# Patient Record
Sex: MALE | Race: ASIAN | NOT HISPANIC OR LATINO | Employment: FULL TIME | URBAN - METROPOLITAN AREA
[De-identification: names, ages, dates, MRNs, and addresses within clinical notes are randomized per-mention and may not be internally consistent; named-entity substitution may affect disease eponyms.]

---

## 2017-07-28 ENCOUNTER — ALLSCRIPTS OFFICE VISIT (OUTPATIENT)
Dept: OTHER | Facility: OTHER | Age: 42
End: 2017-07-28

## 2017-08-05 ENCOUNTER — GENERIC CONVERSION - ENCOUNTER (OUTPATIENT)
Dept: OTHER | Facility: OTHER | Age: 42
End: 2017-08-05

## 2017-08-06 LAB
A/G RATIO (HISTORICAL): 1.9 (ref 1.2–2.2)
ALBUMIN SERPL BCP-MCNC: 4.7 G/DL (ref 3.5–5.5)
ALP SERPL-CCNC: 54 IU/L (ref 39–117)
ALT SERPL W P-5'-P-CCNC: 20 IU/L (ref 0–44)
AST SERPL W P-5'-P-CCNC: 24 IU/L (ref 0–40)
BILIRUB SERPL-MCNC: 0.5 MG/DL (ref 0–1.2)
BUN SERPL-MCNC: 13 MG/DL (ref 6–24)
BUN/CREA RATIO (HISTORICAL): 14 (ref 9–20)
CALCIUM SERPL-MCNC: 9.6 MG/DL (ref 8.7–10.2)
CHLORIDE SERPL-SCNC: 102 MMOL/L (ref 96–106)
CHOLEST SERPL-MCNC: 188 MG/DL (ref 100–199)
CO2 SERPL-SCNC: 23 MMOL/L (ref 18–29)
CREAT SERPL-MCNC: 0.96 MG/DL (ref 0.76–1.27)
EGFR AFRICAN AMERICAN (HISTORICAL): 111 ML/MIN/1.73
EGFR-AMERICAN CALC (HISTORICAL): 96 ML/MIN/1.73
GLUCOSE SERPL-MCNC: 95 MG/DL (ref 65–99)
HDLC SERPL-MCNC: 45 MG/DL
INTERPRETATION (HISTORICAL): NORMAL
LDLC SERPL CALC-MCNC: 119 MG/DL (ref 0–99)
POTASSIUM SERPL-SCNC: 4.4 MMOL/L (ref 3.5–5.2)
PROSTATE SPECIFIC ANTIGEN (HISTORICAL): 0.5 NG/ML (ref 0–4)
SODIUM SERPL-SCNC: 141 MMOL/L (ref 134–144)
TOT. GLOBULIN, SERUM (HISTORICAL): 2.5 G/DL (ref 1.5–4.5)
TOTAL PROTEIN (HISTORICAL): 7.2 G/DL (ref 6–8.5)
TRIGL SERPL-MCNC: 122 MG/DL (ref 0–149)
URIC ACID (HISTORICAL): 9.6 MG/DL (ref 3.7–8.6)

## 2017-08-07 ENCOUNTER — GENERIC CONVERSION - ENCOUNTER (OUTPATIENT)
Dept: OTHER | Facility: OTHER | Age: 42
End: 2017-08-07

## 2018-01-11 NOTE — RESULT NOTES
Verified Results  (1) COMPREHENSIVE METABOLIC PANEL [id number] 11:33ZU e|tab Bone     Test Name Result Flag Reference   Glucose, Serum 95 mg/dL  65-99   BUN 13 mg/dL  6-24   Creatinine, Serum 0 96 mg/dL  0 76-1 27   BUN/Creatinine Ratio 14  9-20   Sodium, Serum 141 mmol/L  134-144   Potassium, Serum 4 4 mmol/L  3 5-5 2   Chloride, Serum 102 mmol/L     Carbon Dioxide, Total 23 mmol/L  18-29   Calcium, Serum 9 6 mg/dL  8 7-10 2   Protein, Total, Serum 7 2 g/dL  6 0-8 5   Albumin, Serum 4 7 g/dL  3 5-5 5   Globulin, Total 2 5 g/dL  1 5-4 5   A/G Ratio 1 9  1 2-2 2   Bilirubin, Total 0 5 mg/dL  0 0-1 2   Alkaline Phosphatase, S 54 IU/L     AST (SGOT) 24 IU/L  0-40   ALT (SGPT) 20 IU/L  0-44   eGFR If NonAfricn Am 96 mL/min/1 73  >59   eGFR If Africn Am 111 mL/min/1 73  >59   Glucose, Serum 95 mg/dL  65-99   BUN 13 mg/dL  6-24   Creatinine, Serum 0 96 mg/dL  0 76-1 27   BUN/Creatinine Ratio 14  9-20   Sodium, Serum 141 mmol/L  134-144   Potassium, Serum 4 4 mmol/L  3 5-5 2   Chloride, Serum 102 mmol/L     Carbon Dioxide, Total 23 mmol/L  18-29   Calcium, Serum 9 6 mg/dL  8 7-10 2   Protein, Total, Serum 7 2 g/dL  6 0-8 5   Albumin, Serum 4 7 g/dL  3 5-5 5   Globulin, Total 2 5 g/dL  1 5-4 5   A/G Ratio 1 9  1 2-2 2   Bilirubin, Total 0 5 mg/dL  0 0-1 2   Alkaline Phosphatase, S 54 IU/L     AST (SGOT) 24 IU/L  0-40   ALT (SGPT) 20 IU/L  0-44   eGFR If NonAfricn Am 96 mL/min/1 73  >59   eGFR If Africn Am 111 mL/min/1 73  >59           (1) LIPID PANEL FASTING W DIRECT LDL REFLEX [id number] 07:11AM e|tab Bone     Test Name Result Flag Reference   Cholesterol, Total 188 mg/dL  100-199   Triglycerides 122 mg/dL  0-149   HDL Cholesterol 45 mg/dL  >39   LDL Cholesterol Calc 119 mg/dL H 0-99   Cholesterol, Total 188 mg/dL  100-199   Triglycerides 122 mg/dL  0-149   HDL Cholesterol 45 mg/dL  >39   LDL Cholesterol Calc 119 mg/dL H 0-99           (1) URIC ACID [id number] 07:11AM Lacy Hsu Test Name Result Flag Reference   Uric Acid, Serum 9 6 mg/dL H 3 7-8 6   Therapeutic target for gout patients: <6 0                       (1) PSA (SCREEN) (Dx V76 44 Screen for Prostate Cancer) [id number] 07:11AM Madhu Roberto     Test Name Result Flag Reference   Prostate Specific Ag, Serum 0 5 ng/mL  0 0-4 0   Roche ECLIA methodology  According to the American Urological Association, Serum PSA should  decrease and remain at undetectable levels after radical  prostatectomy  The AUA defines biochemical recurrence as an initial  PSA value 0 2 ng/mL or greater followed by a subsequent confirmatory  PSA value 0 2 ng/mL or greater  Values obtained with different assay methods or kits cannot be used  interchangeably  Results cannot be interpreted as absolute evidence  of the presence or absence of malignant disease  [facility]) Cardiovascular Risk Assessment [id number] 07:11AM Madhu Fanmarilu     Test Name Result Flag Reference   Interpretation Note     Supplement report is available  PDF Image

## 2018-01-12 NOTE — MISCELLANEOUS
Message   Recorded as Task   Date: 07/27/2016 09:32 AM, Created By: Holly Raza   Task Name: Call Back   Assigned To: Richar Mohamud   Regarding Patient: Kelby Falk, Status: Active   CommentDerwin Jonancy - 27 Jul 2016 9:32 AM     TASK CREATED  Caller: Self; [phone] (Home)  DR Amado Rodriguez   Pt needs a letter for his employer that he was seen here 7/19/2016  Tahmina Mojave - 27 Jul 2016 9:42 AM     TASK EDITED                 letter written and signed by Dr NEVILLE, a copy was made   The patient is aware the letter is up front for   hg   Richar Mohamud - 27 Jul 2016 5:55 PM     TASK EDITED                 noted        Signatures   Electronically signed by : Lily Mayes DO; Jul 27 2016  5:55PM EST                       (Author)

## 2018-01-13 NOTE — PROGRESS NOTES
Assessment    1  Prostate cancer screening (V76 44) (Z12 5)   2  Screening for cardiovascular condition (V81 2) (Z13 6)   3  Screening for diabetes mellitus (DM) (V77 1) (Z13 1)   4  Screening for lipid disorders (V77 91) (Z13 220)   5  Thyroid disorder screening (V77 0) (Z13 29)   6  Encounter for preventive health examination (V70 0) (Z00 00)   7  Chronic gout (274 02) (M1A 9XX0)   8  BMI 27 0-27 9,adult (V85 23) (Z68 27)   9  Overweight (278 02) (E66 3)    Plan  Chronic gout    · Colchicine 0 6 MG Oral Tablet; TAKE 2 TABLETS BY MOUTH AT THE ONSET OF   GOUT FLARE AND 1 TABLET IN 1 HOUR IF NEEDED    1 8MG MAX IN 24 HOURS   · (1) URIC ACID; Status:Active; Requested for:10Ils2282;   Fatigue, Prostate cancer screening    · (1) PSA (SCREEN) (Dx V76 44 Screen for Prostate Cancer); Status:Active; Requested  for:09Epy3528; Health Maintenance, Fatigue, Prostate cancer screening, Screening for cardiovascular  condition, Screening for diabetes mellitus (DM), Screening for lipid disorders,  Thyroid disorder screening    · (1) TSH; Status:Active; Requested for:90Zpf8819; Health Maintenance, Prostate cancer screening, Screening for cardiovascular condition,  Screening for diabetes mellitus (DM), Screening for lipid disorders, Thyroid  disorder screening    · (1) COMPREHENSIVE METABOLIC PANEL; Status:Active; Requested for:[id number];    · (1) LIPID PANEL FASTING W DIRECT LDL REFLEX; Status:Active; Requested  for:06Oel0443;     Discussion/Summary  Impression: health maintenance visit  Currently, he eats a poor diet and has an inadequate exercise regimen  Patient discussion: discussed with the patient  The patient was counseled regarding instructions for management, risk factor reductions, impressions  Chief Complaint  cpe      History of Present Illness  HM, Adult Male: The patient is being seen for a health maintenance and 2yo was last cpe evaluation  General Health:  The patient's health since the last visit is described as good  Immunizations status:  unknown  Lifestyle:  He consumes a diverse and healthy diet  He has weight concerns  He does not exercise regularly  He does not use tobacco  He denies alcohol use  Screening:   HPI: bp 124/80  gout attacks in past, none currently, could do better with diet/fluids      Review of Systems    Cardiovascular: no chest pain  Respiratory: no shortness of breath  Gastrointestinal: no abdominal pain and no blood in stools  Genitourinary: no dysuria and no incontinence  Neurological: no dizziness and no fainting  Active Problems    1  Colon cancer screening (V76 51) (Z12 11)   2  Immunization due (V05 9) (Z23)   3  Prostate cancer screening (V76 44) (Z12 5)   4  Screening for cardiovascular condition (V81 2) (Z13 6)   5  Screening for diabetes mellitus (DM) (V77 1) (Z13 1)   6  Screening for lipid disorders (V77 91) (Z13 220)   7  Thyroid disorder screening (V77 0) (Z13 29)    Family History  Mother    · No pertinent family history  Father    · Family history of hypertension (V17 49) (Z82 49)   · Family history of malignant neoplasm of prostate (V16 42) (Z80 45)    Social History    · Never smoker    Current Meds   1  Multi-Vitamin Oral Tablet; two a day Recorded    Allergies    1  No Known Drug Allergies    Vitals   Recorded: [id number] 08:37PM Recorded: 51VBG4459 04:30PM   Temperature  98 2 F   Heart Rate  84   Respiration  16   Systolic 931 902   Diastolic 80 90   Height  5 ft 8 in   Weight  182 lb    BMI Calculated  27 67   BSA Calculated  1 97     Physical Exam    Constitutional   General appearance: No acute distress, well appearing and well nourished  Eyes   Conjunctiva and lids: No erythema, swelling or discharge  Pupils and irises: Equal, round, reactive to light  Ears, Nose, Mouth, and Throat   External inspection of ears and nose: Normal     Otoscopic examination: Tympanic membranes translucent with normal light reflex   Canals patent without erythema  Nasal mucosa, septum, and turbinates: Normal without edema or erythema  Lips, teeth, and gums: Normal, good dentition  Oropharynx: Normal with no erythema, edema, exudate or lesions  Neck   Neck: Supple, symmetric, trachea midline, no masses  Thyroid: Normal, no thyromegaly  Pulmonary   Respiratory effort: No increased work of breathing or signs of respiratory distress  Auscultation of lungs: Clear to auscultation  Cardiovascular   Auscultation of heart: Normal rate and rhythm, normal S1 and S2, no murmurs  Carotid pulses: 2+ bilaterally  Pedal pulses: 2+ bilaterally  Examination of extremities for edema and/or varicosities: Normal     Abdomen   Abdomen: Non-tender, no masses  Liver and spleen: No hepatomegaly or splenomegaly  Examination for hernias: No hernias appreciated  Anus, perineum, and rectum: Normal sphincter tone, no masses, no prolapse  Genitourinary   Scrotal contents: Normal testes, no masses  Penis: Normal, no lesions  Digital rectal exam of prostate: Normal size, no masses  Lymphatic   Palpation of lymph nodes in neck: No lymphadenopathy  Palpation of lymph nodes in groin: No lymphadenopathy  Musculoskeletal   Gait and station: Normal     Inspection/palpation of digits and nails: Normal without clubbing or cyanosis  Inspection/palpation of joints, bones, and muscles: Normal     Range of motion: Normal     Stability: Normal     Muscle strength/tone: Normal     Skin   Skin and subcutaneous tissue: Normal without rashes or lesions  Palpation of skin and subcutaneous tissue: Normal turgor  Neurologic   Reflexes: 2+ and symmetric  Sensation: No sensory loss  Psychiatric   Judgment and insight: Normal     Mood and affect: Normal        Procedure    Procedure: Indication: routine screening     Results: 20/20 in both eyes without corrective device, 20/20 in the right eye without corrective device, 20/20 in the left eye without corrective device   Color vision was and the results were normal       Signatures   Electronically signed by : Anayeli Amaya DO; May 18 2016  8:41PM EST                       (Author)

## 2018-01-15 NOTE — RESULT NOTES
Verified Results  (1) COMPREHENSIVE METABOLIC PANEL [id number] 54:78RK Nolvia Pelt     Test Name Result Flag Reference   Glucose, Serum 92 mg/dL  65-99   BUN 16 mg/dL  6-24   Creatinine, Serum 1 03 mg/dL  0 76-1 27   eGFR If NonAfricn Am 89 mL/min/1 73  >59   eGFR If Africn Am 103 mL/min/1 73  >59   BUN/Creatinine Ratio 16  9-20   Sodium, Serum 140 mmol/L  134-144   Potassium, Serum 4 3 mmol/L  3 5-5 2   Chloride, Serum 101 mmol/L     Carbon Dioxide, Total 21 mmol/L  18-29   Calcium, Serum 9 8 mg/dL  8 7-10 2   Protein, Total, Serum 7 4 g/dL  6 0-8 5   Albumin, Serum 4 7 g/dL  3 5-5 5   Globulin, Total 2 7 g/dL  1 5-4 5   A/G Ratio 1 7  1 1-2 5   Bilirubin, Total 0 6 mg/dL  0 0-1 2   Alkaline Phosphatase, S 58 IU/L     AST (SGOT) 19 IU/L  0-40   ALT (SGPT) 21 IU/L  0-44     (1) LIPID PANEL FASTING W DIRECT LDL REFLEX [id number] 08:06AM Nolvia Pelt     Test Name Result Flag Reference   Cholesterol, Total 194 mg/dL  100-199   Triglycerides 230 mg/dL H 0-149   HDL Cholesterol 42 mg/dL  >39   According to ATP-III Guidelines, HDL-C >59 mg/dL is considered a  negative risk factor for CHD  LDL Cholesterol Calc 106 mg/dL H 0-99     (1) TSH [id number] 08:06AM Nolvia Pelt     Test Name Result Flag Reference   TSH 1 970 uIU/mL  0 450-4 500     (1) URIC ACID [id number] 08:06AM Nolvia Pelt     Test Name Result Flag Reference   Uric Acid, Serum 11 9 mg/dL H 3 7-8 6   Therapeutic target for gout patients: <6 0     [facility]) Cardiovascular Risk Assessment [id number] 08:06AM Nolvia Pelt     Test Name Result Flag Reference   Interpretation Note     Supplement report is available  PDF Image   (1) PSA (SCREEN) (Dx V76 44 Screen for Prostate Cancer) [id number] 08:06AM Nolvia Pelt     Test Name Result Flag Reference   Prostate Specific Ag, Serum 0 4 ng/mL  0 0-4 0   Roche ECLIA methodology    According to the American Urological Association, Serum PSA should  decrease and remain at undetectable levels after radical  prostatectomy  The AUA defines biochemical recurrence as an initial  PSA value 0 2 ng/mL or greater followed by a subsequent confirmatory  PSA value 0 2 ng/mL or greater  Values obtained with different assay methods or kits cannot be used  interchangeably  Results cannot be interpreted as absolute evidence  of the presence or absence of malignant disease  Discussion/Summary   Sugar, kidneys, minerals and liver are normal    Cholesterol is ok  Gout level (uric acid) is high  Medications for prevention of attacks can be discussed  Thyroid level is good     Prostate level is normal   Dr Fulton

## 2018-01-16 NOTE — PROGRESS NOTES
Assessment    1  Chronic gout (274 02) (M1A 9XX0)   2  Screening for cardiovascular, respiratory, and genitourinary diseases   (V81 2,V81 4,V81 6) (Z13 6,Z13 83,Z13 89)   3  Screening for diabetes mellitus (DM) (V77 1) (Z13 1)   4  Encounter for preventive health examination (V70 0) (Z00 00)   5  BMI 26 0-26 9,adult (V85 22) (Z68 26)   6  History of Overweight (278 02) (E66 3)    Plan  Chronic gout    · Colchicine 0 6 MG Oral Tablet; TAKE 2 TABLETS BY MOUTH AT THE ONSET OF   GOUT FLARE AND 1 TABLET IN 1 HOUR IF NEEDED    1 8MG MAX IN 24 HOURS  Chronic gout, Health Maintenance, Prostate cancer screening, Screening for  cardiovascular, respiratory, and genitourinary diseases, Screening for diabetes mellitus  (DM)    · (1) PSA (SCREEN) (Dx V76 44 Screen for Prostate Cancer); Status:Active; Requested  for:39Eay5072;   Chronic gout, Health Maintenance, Screening for cardiovascular, respiratory, and  genitourinary diseases, Screening for diabetes mellitus (DM)    · (1) COMPREHENSIVE METABOLIC PANEL; Status:Active; Requested for:[id number];    · (1) LIPID PANEL FASTING W DIRECT LDL REFLEX; Status:Active; Requested  for:66Ecd5529;    · (1) URIC ACID; Status:Active; Requested for:52Zzm0710;     Discussion/Summary  The patient was counseled regarding risk factor reductions, impressions, risks and benefits of treatment options, importance of compliance with treatment  Possible side effects of new medications were reviewed with the patient/guardian today  The treatment plan was reviewed with the patient/guardian  The patient/guardian understands and agrees with the treatment plan      Chief Complaint  Seen for a CPE  er/cma  History of Present Illness  HM, Adult Male: The patient is being seen for a health maintenance evaluation  General Health: The patient's health since the last visit is described as good  Lifestyle:  He does not have a healthy diet  He does not exercise regularly   He does not use tobacco  He denies alcohol use  Screening:   HPI: rare gout attack  maybe 1 per year  diet aware  has 3yo adopted child from [address] [place] [address]    Cardiovascular: no chest pain  Respiratory: no shortness of breath  Gastrointestinal: no abdominal pain  Active Problems    1  Chronic gout (274 02) (M1A 9XX0)   2  Colon cancer screening (V76 51) (Z12 11)   3  Immunization due (V05 9) (Z23)   4  Impacted cerumen (380 4) (H61 20)   5  Prostate cancer screening (V76 44) (Z12 5)   6  Screening for cardiovascular, respiratory, and genitourinary diseases   (V81 2,V81 4,V81 6) (Z13 6,Z13 83,Z13 89)   7  Screening for diabetes mellitus (DM) (V77 1) (Z13 1)   8  Thyroid disorder screening (V77 0) (Z13 29)    Past Medical History    · History of Actinic otitis externa of right ear, unspecified chronicity (380 22) (H60 8X1)   · History of fatigue (V13 89) (X67 897)   · History of Overweight (278 02) (E66 3)    Family History  Mother    · No pertinent family history  Father    · Family history of hypertension (V17 49) (Z82 49)   · Family history of malignant neoplasm of prostate (V16 42) (Z80 45)    Social History    · Never smoker    Current Meds   1  Multi-Vitamin Oral Tablet; two a day; Therapy: (Recorded:90Jxg0263) to Recorded    Allergies    1  No Known Drug Allergies    Vitals   Recorded: 16Ytl9241 03:59PM   Temperature 96 2 F   Heart Rate 76   Respiration 16   Systolic 447   Diastolic 82   Height 5 ft 8 in   Weight 175 lb    BMI Calculated 26 61   BSA Calculated 1 93     Physical Exam    Constitutional   General appearance: No acute distress, well appearing and well nourished  Eyes   Conjunctiva and lids: No erythema, swelling or discharge  Pupils and irises: Equal, round, reactive to light  Ears, Nose, Mouth, and Throat   External inspection of ears and nose: Normal     Otoscopic examination: Tympanic membranes translucent with normal light reflex  Canals patent without erythema      Nasal mucosa, septum, and turbinates: Normal without edema or erythema  Lips, teeth, and gums: Normal, good dentition  Oropharynx: Normal with no erythema, edema, exudate or lesions  Neck   Neck: Supple, symmetric, trachea midline, no masses  Thyroid: Normal, no thyromegaly  Pulmonary   Respiratory effort: No increased work of breathing or signs of respiratory distress  Auscultation of lungs: Clear to auscultation  Cardiovascular   Auscultation of heart: Normal rate and rhythm, normal S1 and S2, no murmurs  Carotid pulses: 2+ bilaterally  Pedal pulses: 2+ bilaterally  Examination of extremities for edema and/or varicosities: Normal     Abdomen   Abdomen: Non-tender, no masses  Liver and spleen: No hepatomegaly or splenomegaly  Genitourinary   Scrotal contents: Normal testes, no masses  Penis: Normal, no lesions  Digital rectal exam of prostate: Normal size, no masses  Lymphatic   Palpation of lymph nodes in neck: No lymphadenopathy  Palpation of lymph nodes in groin: No lymphadenopathy  Musculoskeletal   Gait and station: Normal     Inspection/palpation of digits and nails: Normal without clubbing or cyanosis  Inspection/palpation of joints, bones, and muscles: Normal     Range of motion: Normal     Stability: Normal     Muscle strength/tone: Normal     Skin   Skin and subcutaneous tissue: Normal without rashes or lesions  Palpation of skin and subcutaneous tissue: Normal turgor  Neurologic   Reflexes: 2+ and symmetric  Sensation: No sensory loss  Psychiatric   Judgment and insight: Normal     Mood and affect: Normal        Results/Data  PHQ-2 Adult Depression Screening [id number] 04:04PM User, s     Test Name Result Flag Reference   PHQ-2 Adult Depression Score 0     Over the last two weeks, how often have you been bothered by any of the following problems?   Little interest or pleasure in doing things: Not at all - 0  Feeling down, depressed, or hopeless: Not at all - 0 PHQ-2 Adult Depression Screening Negative         Procedure    Procedure: Visual Acuity Test    Indication: routine screening  Inforrmation supplied by a Snellen chart  Results: 20/20 in both eyes without corrective device, 20/25 in the right eye without corrective device, 20/20 in the left eye without corrective device      Provider Comments  Provider Comments:   check uric acid level w/o attack for baseline  Diet/exercise/weight loss is recommended          Signatures   Electronically signed by : Marivel Zamora DO; Jul 28 2017 10:01PM EST                       (Author)

## 2018-01-22 VITALS
WEIGHT: 175 LBS | BODY MASS INDEX: 26.52 KG/M2 | HEIGHT: 68 IN | RESPIRATION RATE: 16 BRPM | DIASTOLIC BLOOD PRESSURE: 82 MMHG | HEART RATE: 76 BPM | SYSTOLIC BLOOD PRESSURE: 130 MMHG | TEMPERATURE: 96.2 F

## 2019-01-14 ENCOUNTER — TELEPHONE (OUTPATIENT)
Dept: FAMILY MEDICINE CLINIC | Facility: CLINIC | Age: 44
End: 2019-01-14

## 2019-01-14 NOTE — TELEPHONE ENCOUNTER
Ronnie Zacarias is saying he has gout and he needs a prescription  I told him 5 times that dr Dorothea Escamilla will not prescribe him anything because he has NOT been seen since 7/2017  He would not give up  He said he can barely walk so he can't come in      Please call patient

## 2019-01-14 NOTE — TELEPHONE ENCOUNTER
He has a diagnosis of gout and I have prescribed him medication  If he was not on any medication, he would be advised to be seen yearly  Since I prescribe him medication, he would need to be seen every 6 months for any refills

## 2019-01-14 NOTE — TELEPHONE ENCOUNTER
LMOM to call back office and that Pt needed an appointment to get script because he has not been here since July 2017    Pt contact  number [phone]  Number in chart is not working  [facility]

## 2019-05-12 RX ORDER — COLCHICINE 0.6 MG/1
TABLET ORAL
COMMUNITY
Start: 2016-05-18 | End: 2019-05-15 | Stop reason: SDUPTHER

## 2019-05-15 ENCOUNTER — OFFICE VISIT (OUTPATIENT)
Dept: FAMILY MEDICINE CLINIC | Facility: CLINIC | Age: 44
End: 2019-05-15
Payer: COMMERCIAL

## 2019-05-15 VITALS
SYSTOLIC BLOOD PRESSURE: 124 MMHG | HEIGHT: 68 IN | DIASTOLIC BLOOD PRESSURE: 80 MMHG | BODY MASS INDEX: 25.82 KG/M2 | TEMPERATURE: 98.6 F | WEIGHT: 170.4 LBS | RESPIRATION RATE: 18 BRPM

## 2019-05-15 DIAGNOSIS — Z13.89 SCREENING FOR CARDIOVASCULAR, RESPIRATORY, AND GENITOURINARY DISEASES: ICD-10-CM

## 2019-05-15 DIAGNOSIS — Z12.5 PROSTATE CANCER SCREENING: ICD-10-CM

## 2019-05-15 DIAGNOSIS — Z13.83 SCREENING FOR CARDIOVASCULAR, RESPIRATORY, AND GENITOURINARY DISEASES: ICD-10-CM

## 2019-05-15 DIAGNOSIS — Z23 IMMUNIZATION DUE: ICD-10-CM

## 2019-05-15 DIAGNOSIS — Z13.6 SCREENING FOR CARDIOVASCULAR, RESPIRATORY, AND GENITOURINARY DISEASES: ICD-10-CM

## 2019-05-15 DIAGNOSIS — Z13.1 SCREENING FOR DIABETES MELLITUS (DM): ICD-10-CM

## 2019-05-15 DIAGNOSIS — M1A.9XX0 CHRONIC GOUT INVOLVING TOE WITHOUT TOPHUS, UNSPECIFIED CAUSE, UNSPECIFIED LATERALITY: ICD-10-CM

## 2019-05-15 DIAGNOSIS — Z00.00 HEALTHCARE MAINTENANCE: Primary | ICD-10-CM

## 2019-05-15 PROCEDURE — 90715 TDAP VACCINE 7 YRS/> IM: CPT

## 2019-05-15 PROCEDURE — 99396 PREV VISIT EST AGE 40-64: CPT | Performed by: FAMILY MEDICINE

## 2019-05-15 PROCEDURE — 90471 IMMUNIZATION ADMIN: CPT

## 2019-05-15 RX ORDER — COLCHICINE 0.6 MG/1
0.6 TABLET ORAL 3 TIMES DAILY PRN
Qty: 60 TABLET | Refills: 1 | Status: SHIPPED | OUTPATIENT
Start: 2019-05-15 | End: 2020-09-30 | Stop reason: SDUPTHER

## 2019-05-19 LAB
ALBUMIN SERPL-MCNC: 4.9 G/DL (ref 3.5–5.5)
ALBUMIN/GLOB SERPL: 1.8 {RATIO} (ref 1.2–2.2)
ALP SERPL-CCNC: 56 IU/L (ref 39–117)
ALT SERPL-CCNC: 21 IU/L (ref 0–44)
AST SERPL-CCNC: 25 IU/L (ref 0–40)
BILIRUB SERPL-MCNC: 0.6 MG/DL (ref 0–1.2)
BUN SERPL-MCNC: 12 MG/DL (ref 6–24)
BUN/CREAT SERPL: 12 (ref 9–20)
CALCIUM SERPL-MCNC: 9.9 MG/DL (ref 8.7–10.2)
CHLORIDE SERPL-SCNC: 101 MMOL/L (ref 96–106)
CHOLEST SERPL-MCNC: 215 MG/DL (ref 100–199)
CO2 SERPL-SCNC: 22 MMOL/L (ref 20–29)
CREAT SERPL-MCNC: 1.03 MG/DL (ref 0.76–1.27)
GLOBULIN SER-MCNC: 2.7 G/DL (ref 1.5–4.5)
GLUCOSE SERPL-MCNC: 99 MG/DL (ref 65–99)
HDLC SERPL-MCNC: 55 MG/DL
LABCORP COMMENT: NORMAL
LDLC SERPL CALC-MCNC: 131 MG/DL (ref 0–99)
MICRODELETION SYND BLD/T FISH: NORMAL
POTASSIUM SERPL-SCNC: 4.3 MMOL/L (ref 3.5–5.2)
PROT SERPL-MCNC: 7.6 G/DL (ref 6–8.5)
PSA SERPL-MCNC: 0.5 NG/ML (ref 0–4)
SL AMB EGFR AFRICAN AMERICAN: 101 ML/MIN/1.73
SL AMB EGFR NON AFRICAN AMERICAN: 87 ML/MIN/1.73
SODIUM SERPL-SCNC: 139 MMOL/L (ref 134–144)
TRIGL SERPL-MCNC: 143 MG/DL (ref 0–149)
URATE SERPL-MCNC: 9.4 MG/DL (ref 3.7–8.6)

## 2020-09-30 ENCOUNTER — OFFICE VISIT (OUTPATIENT)
Dept: FAMILY MEDICINE CLINIC | Facility: CLINIC | Age: 45
End: 2020-09-30
Payer: COMMERCIAL

## 2020-09-30 VITALS
WEIGHT: 172 LBS | DIASTOLIC BLOOD PRESSURE: 84 MMHG | HEART RATE: 82 BPM | TEMPERATURE: 97.5 F | RESPIRATION RATE: 16 BRPM | HEIGHT: 69 IN | OXYGEN SATURATION: 97 % | BODY MASS INDEX: 25.48 KG/M2 | SYSTOLIC BLOOD PRESSURE: 136 MMHG

## 2020-09-30 DIAGNOSIS — Z13.83 SCREENING FOR CARDIOVASCULAR, RESPIRATORY, AND GENITOURINARY DISEASES: ICD-10-CM

## 2020-09-30 DIAGNOSIS — M1A.9XX0 CHRONIC GOUT INVOLVING TOE WITHOUT TOPHUS, UNSPECIFIED CAUSE, UNSPECIFIED LATERALITY: ICD-10-CM

## 2020-09-30 DIAGNOSIS — Z13.1 SCREENING FOR DIABETES MELLITUS (DM): ICD-10-CM

## 2020-09-30 DIAGNOSIS — Z13.6 SCREENING FOR CARDIOVASCULAR, RESPIRATORY, AND GENITOURINARY DISEASES: ICD-10-CM

## 2020-09-30 DIAGNOSIS — Z13.89 SCREENING FOR CARDIOVASCULAR, RESPIRATORY, AND GENITOURINARY DISEASES: ICD-10-CM

## 2020-09-30 DIAGNOSIS — Z00.00 HEALTHCARE MAINTENANCE: Primary | ICD-10-CM

## 2020-09-30 DIAGNOSIS — Z12.5 PROSTATE CANCER SCREENING: ICD-10-CM

## 2020-09-30 PROCEDURE — 99396 PREV VISIT EST AGE 40-64: CPT | Performed by: FAMILY MEDICINE

## 2020-09-30 RX ORDER — COLCHICINE 0.6 MG/1
0.6 TABLET ORAL 3 TIMES DAILY PRN
Qty: 30 TABLET | Refills: 2 | Status: SHIPPED | OUTPATIENT
Start: 2020-09-30

## 2020-09-30 NOTE — PROGRESS NOTES
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, unspecified cause, unspecified laterality  -     colchicine (COLCRYS) 0 6 mg tablet; Take 1 tablet (0 6 mg total) by mouth 3 (three) times a day as needed (gout attacks)  -     Uric acid; Future          Return if symptoms worsen or fail to improve  Subjective:      Patient ID: Gini Mcgarry is a 50 y o  male  Chief Complaint   Patient presents with    Physical Exam       HPI  No wt change  Gout attacks 1-2x/w  Physical work  No exercise   work    Wife a nurse in [place]    Colchicine works in past, tolerated    Has a son, 8yo    The following portions of the patient's history were reviewed and updated as appropriate: allergies, current medications, past family history, past medical history, past social history, past surgical history and problem list     Review of Systems   Constitutional: Negative for fever  Respiratory: Negative for shortness of breath  Cardiovascular: Negative for chest pain  Current Outpatient Medications   Medication Sig Dispense Refill    Multiple Vitamin (MULTI VITAMIN DAILY PO) Take by mouth      colchicine (COLCRYS) 0 6 mg tablet Take 1 tablet (0 6 mg total) by mouth 3 (three) times a day as needed (gout attacks) 30 tablet 2     No current facility-administered medications for this visit          Objective:    /84   Pulse 82   Temp 97 5 °F (36 4 °C)   Resp 16   Ht 5' 9" (1 753 m) Wt 78 kg (172 lb)   SpO2 97%   BMI 25 40 kg/m²        Physical Exam  Vitals signs and nursing note reviewed  Constitutional:       General: He is not in acute distress  Appearance: He is well-developed  He is not ill-appearing  HENT:      Head: Normocephalic  Right Ear: Tympanic membrane normal       Left Ear: Tympanic membrane normal    Eyes:      General: No scleral icterus  Conjunctiva/sclera: Conjunctivae normal    Neck:      Musculoskeletal: Neck supple  Vascular: No carotid bruit  Cardiovascular:      Rate and Rhythm: Normal rate and regular rhythm  Heart sounds: No murmur  Pulmonary:      Effort: Pulmonary effort is normal  No respiratory distress  Breath sounds: No wheezing  Abdominal:      General: There is no distension  Palpations: Abdomen is soft  Tenderness: There is no abdominal tenderness  Hernia: No hernia is present  Genitourinary:     Penis: Normal        Scrotum/Testes: Normal       Prostate: Normal       Rectum: Normal    Musculoskeletal:         General: No deformity  Skin:     General: Skin is warm and dry  Coloration: Skin is not pale  Neurological:      Mental Status: He is alert  Motor: No weakness  Gait: Gait normal    Psychiatric:         Mood and Affect: Mood normal          Behavior: Behavior normal          Thought Content: Thought content normal        BMI Counseling: Body mass index is 25 4 kg/m²  The BMI is above normal  Nutrition recommendations include decreasing portion sizes and moderation in carbohydrate intake  Exercise recommendations include exercising 3-5 times per week  No pharmacotherapy was ordered                  Arelis Brennan DO

## 2020-10-03 LAB
ALBUMIN SERPL-MCNC: 4.9 G/DL (ref 4–5)
ALBUMIN/GLOB SERPL: 2 {RATIO} (ref 1.2–2.2)
ALP SERPL-CCNC: 51 IU/L (ref 39–117)
ALT SERPL-CCNC: 27 IU/L (ref 0–44)
AST SERPL-CCNC: 32 IU/L (ref 0–40)
BILIRUB SERPL-MCNC: 0.4 MG/DL (ref 0–1.2)
BUN SERPL-MCNC: 13 MG/DL (ref 6–24)
BUN/CREAT SERPL: 12 (ref 9–20)
CALCIUM SERPL-MCNC: 9.8 MG/DL (ref 8.7–10.2)
CHLORIDE SERPL-SCNC: 102 MMOL/L (ref 96–106)
CHOLEST SERPL-MCNC: 208 MG/DL (ref 100–199)
CO2 SERPL-SCNC: 20 MMOL/L (ref 20–29)
CREAT SERPL-MCNC: 1.07 MG/DL (ref 0.76–1.27)
GLOBULIN SER-MCNC: 2.5 G/DL (ref 1.5–4.5)
GLUCOSE SERPL-MCNC: 81 MG/DL (ref 65–99)
HDLC SERPL-MCNC: 56 MG/DL
LDLC SERPL CALC-MCNC: 126 MG/DL (ref 0–99)
MICRODELETION SYND BLD/T FISH: NORMAL
POTASSIUM SERPL-SCNC: 3.7 MMOL/L (ref 3.5–5.2)
PROT SERPL-MCNC: 7.4 G/DL (ref 6–8.5)
PSA SERPL-MCNC: 0.4 NG/ML (ref 0–4)
SL AMB EGFR AFRICAN AMERICAN: 95 ML/MIN/1.73
SL AMB EGFR NON AFRICAN AMERICAN: 82 ML/MIN/1.73
SODIUM SERPL-SCNC: 139 MMOL/L (ref 134–144)
TRIGL SERPL-MCNC: 149 MG/DL (ref 0–149)
URATE SERPL-MCNC: 9.2 MG/DL (ref 3.7–8.6)

## 2021-10-12 ENCOUNTER — OFFICE VISIT (OUTPATIENT)
Dept: FAMILY MEDICINE CLINIC | Facility: CLINIC | Age: 46
End: 2021-10-12
Payer: COMMERCIAL

## 2021-10-12 VITALS
WEIGHT: 169 LBS | TEMPERATURE: 98 F | HEART RATE: 77 BPM | SYSTOLIC BLOOD PRESSURE: 122 MMHG | RESPIRATION RATE: 16 BRPM | HEIGHT: 68 IN | DIASTOLIC BLOOD PRESSURE: 76 MMHG | BODY MASS INDEX: 25.61 KG/M2 | OXYGEN SATURATION: 97 %

## 2021-10-12 DIAGNOSIS — Z13.6 SCREENING FOR CARDIOVASCULAR, RESPIRATORY, AND GENITOURINARY DISEASES: ICD-10-CM

## 2021-10-12 DIAGNOSIS — M1A.9XX0 CHRONIC GOUT INVOLVING TOE WITHOUT TOPHUS, UNSPECIFIED CAUSE, UNSPECIFIED LATERALITY: ICD-10-CM

## 2021-10-12 DIAGNOSIS — Z12.5 PROSTATE CANCER SCREENING: ICD-10-CM

## 2021-10-12 DIAGNOSIS — Z13.1 SCREENING FOR DIABETES MELLITUS (DM): ICD-10-CM

## 2021-10-12 DIAGNOSIS — Z13.83 SCREENING FOR CARDIOVASCULAR, RESPIRATORY, AND GENITOURINARY DISEASES: ICD-10-CM

## 2021-10-12 DIAGNOSIS — Z00.00 HEALTHCARE MAINTENANCE: Primary | ICD-10-CM

## 2021-10-12 DIAGNOSIS — Z13.89 SCREENING FOR CARDIOVASCULAR, RESPIRATORY, AND GENITOURINARY DISEASES: ICD-10-CM

## 2021-10-12 PROCEDURE — 3725F SCREEN DEPRESSION PERFORMED: CPT | Performed by: FAMILY MEDICINE

## 2021-10-12 PROCEDURE — 3008F BODY MASS INDEX DOCD: CPT | Performed by: FAMILY MEDICINE

## 2021-10-12 PROCEDURE — 99396 PREV VISIT EST AGE 40-64: CPT | Performed by: FAMILY MEDICINE

## 2021-10-16 LAB
ALBUMIN SERPL-MCNC: 4.8 G/DL (ref 4–5)
ALBUMIN/GLOB SERPL: 1.8 {RATIO} (ref 1.2–2.2)
ALP SERPL-CCNC: 57 IU/L (ref 44–121)
ALT SERPL-CCNC: 19 IU/L (ref 0–44)
AST SERPL-CCNC: 21 IU/L (ref 0–40)
BILIRUB SERPL-MCNC: 0.5 MG/DL (ref 0–1.2)
BUN SERPL-MCNC: 12 MG/DL (ref 6–24)
BUN/CREAT SERPL: 11 (ref 9–20)
CALCIUM SERPL-MCNC: 9.8 MG/DL (ref 8.7–10.2)
CHLORIDE SERPL-SCNC: 102 MMOL/L (ref 96–106)
CHOLEST SERPL-MCNC: 220 MG/DL (ref 100–199)
CO2 SERPL-SCNC: 23 MMOL/L (ref 20–29)
CREAT SERPL-MCNC: 1.12 MG/DL (ref 0.76–1.27)
GLOBULIN SER-MCNC: 2.7 G/DL (ref 1.5–4.5)
GLUCOSE SERPL-MCNC: 77 MG/DL (ref 65–99)
HDLC SERPL-MCNC: 49 MG/DL
LDLC SERPL CALC-MCNC: 138 MG/DL (ref 0–99)
MICRODELETION SYND BLD/T FISH: NORMAL
POTASSIUM SERPL-SCNC: 3.9 MMOL/L (ref 3.5–5.2)
PROT SERPL-MCNC: 7.5 G/DL (ref 6–8.5)
PSA SERPL-MCNC: 0.5 NG/ML (ref 0–4)
SL AMB EGFR AFRICAN AMERICAN: 89 ML/MIN/1.73
SL AMB EGFR NON AFRICAN AMERICAN: 77 ML/MIN/1.73
SODIUM SERPL-SCNC: 140 MMOL/L (ref 134–144)
TRIGL SERPL-MCNC: 183 MG/DL (ref 0–149)
URATE SERPL-MCNC: 9.6 MG/DL (ref 3.8–8.4)

## 2023-01-27 ENCOUNTER — APPOINTMENT (OUTPATIENT)
Dept: RADIOLOGY | Facility: CLINIC | Age: 48
End: 2023-01-27

## 2023-01-27 ENCOUNTER — OFFICE VISIT (OUTPATIENT)
Dept: URGENT CARE | Facility: CLINIC | Age: 48
End: 2023-01-27

## 2023-01-27 VITALS
RESPIRATION RATE: 20 BRPM | HEART RATE: 99 BPM | TEMPERATURE: 99.1 F | WEIGHT: 160 LBS | BODY MASS INDEX: 23.7 KG/M2 | OXYGEN SATURATION: 96 % | SYSTOLIC BLOOD PRESSURE: 160 MMHG | DIASTOLIC BLOOD PRESSURE: 100 MMHG | HEIGHT: 69 IN

## 2023-01-27 DIAGNOSIS — R05.1 ACUTE COUGH: ICD-10-CM

## 2023-01-27 DIAGNOSIS — R03.0 ELEVATED BP WITHOUT DIAGNOSIS OF HYPERTENSION: ICD-10-CM

## 2023-01-27 DIAGNOSIS — R05.1 ACUTE COUGH: Primary | ICD-10-CM

## 2023-01-27 RX ORDER — DOXYCYCLINE 100 MG/1
100 CAPSULE ORAL 2 TIMES DAILY
Qty: 14 CAPSULE | Refills: 0 | Status: SHIPPED | OUTPATIENT
Start: 2023-01-27 | End: 2023-02-03

## 2023-01-27 RX ORDER — BENZONATATE 100 MG/1
100 CAPSULE ORAL 3 TIMES DAILY PRN
Qty: 20 CAPSULE | Refills: 0 | Status: SHIPPED | OUTPATIENT
Start: 2023-01-27 | End: 2023-02-05

## 2023-01-27 NOTE — PATIENT INSTRUCTIONS
Pneumonia   WHAT YOU NEED TO KNOW:   Pneumonia is an infection in your lungs caused by bacteria, viruses, fungi, or parasites  You can become infected if you come in contact with someone who is sick  You can get pneumonia if you recently had surgery or needed a ventilator to help you breathe  Pneumonia can also be caused by accidentally inhaling saliva or small pieces of food  Pneumonia may cause mild symptoms, or it can be severe and life-threatening  DISCHARGE INSTRUCTIONS:   Return to the emergency department if:   You cough up blood  Your heart beats more than 100 beats in 1 minute  You are very tired, confused, and cannot think clearly  You have chest pain or trouble breathing  Your lips or fingernails turn gray or blue  Call your doctor if:   Your symptoms are the same or get worse 48 hours after you start antibiotics  Your fever is not below 99°F (37 2°C) 48 hours after you start antibiotics  You have a fever higher than 101°F (38 3°C)  You cannot eat, or you have loss of appetite, nausea, or are vomiting  You have questions or concerns about your condition or care  Medicines: You may need any of the following:  Antibiotics  treat pneumonia caused by bacteria  Acetaminophen  decreases pain and fever  It is available without a doctor's order  Ask how much to take and how often to take it  Follow directions  Read the labels of all other medicines you are using to see if they also contain acetaminophen, or ask your doctor or pharmacist  Acetaminophen can cause liver damage if not taken correctly  Do not use more than 4 grams (4,000 milligrams) total of acetaminophen in one day  NSAIDs , such as ibuprofen, help decrease swelling, pain, and fever  This medicine is available with or without a doctor's order  NSAIDs can cause stomach bleeding or kidney problems in certain people   If you take blood thinner medicine, always ask your healthcare provider if NSAIDs are safe for you  Always read the medicine label and follow directions  Take your medicine as directed  Contact your healthcare provider if you think your medicine is not helping or if you have side effects  Tell him or her if you are allergic to any medicine  Keep a list of the medicines, vitamins, and herbs you take  Include the amounts, and when and why you take them  Bring the list or the pill bottles to follow-up visits  Carry your medicine list with you in case of an emergency  Follow up with your doctor as directed: You will need to return for more tests  Write down your questions so you remember to ask them during your visits  Manage your symptoms:   Rest as needed  Rest often throughout the day  Alternate times of activity with times of rest     Drink liquids as directed  Ask how much liquid to drink each day and which liquids are best for you  Liquids help thin your mucus, which may make it easier for you to cough it up  Do not smoke  Avoid secondhand smoke  Smoking increases your risk for pneumonia  Smoking also makes it harder for you to get better after you have had pneumonia  Ask your healthcare provider for information if you need help to quit smoking  Limit alcohol  Women should limit alcohol to 1 drink a day  Men should limit alcohol to 2 drinks a day  A drink of alcohol is 12 ounces of beer, 5 ounces of wine, or 1½ ounces of liquor  Use a cool mist humidifier  A humidifier will help increase air moisture in your home  This may make it easier for you to breathe and help decrease your cough  Keep your head elevated  You may be able to breathe better if you lie down with the head of your bed up  Prevent pneumonia:   Wash your hands often  Use soap and water every time you wash your hands  Rub your soapy hands together, lacing your fingers  Use the fingers of one hand to scrub under the nails of the other hand  Wash for at least 20 seconds   Rinse with warm, running water for several seconds  Then dry your hands with a clean towel or paper towel  Use hand  that contains alcohol if soap and water are not available  Do not touch your eyes, nose, or mouth without washing your hands first          Cover a sneeze or cough  Use a tissue that covers your mouth and nose  Throw the tissue away in a trash can right away  Use the bend of your arm if a tissue is not available  Wash your hands well with soap and water or use a hand   Do not stand close to anyone who is sneezing or coughing  Stay away from others until you are well  Do not go to work or other activities  Wait until your symptoms are gone or your healthcare provider says it is okay to return  Ask about vaccines you may need  You may need a vaccine to help prevent pneumonia  Get an influenza (flu) vaccine every year as soon as recommended, usually in September or October  Flu viruses change, so it is important to get a yearly flu vaccine  © Copyright [employer] 2022 Information is for End User's use only and may not be sold, redistributed or otherwise used for commercial purposes  All illustrations and images included in CareNotes® are the copyrighted property of A [employer]  or [employer]   The above information is an  only  It is not intended as medical advice for individual conditions or treatments  Talk to your doctor, nurse or pharmacist before following any medical regimen to see if it is safe and effective for you  Acute Cough:   -The Xray showed possible consolidation over the right lower lobe  Awaiting official read  Will place the patient on doxycyline 100mg PO BID x 7 days  Take with meals   Tessalon perles for the coughing    -Stay very well hydrated and push fluids  -Run a humidifier by your bed and take steam showers to clear mucus   -Zicam nasal AllClear can be soothing to the nasal passages   -You can use flonase once daily for two weeks   -Advil or Tylenol for fever or pain  -Mucinex OTC or Zyrtec or Claritin  Drink plenty of water with the mucinex    -Honey or throat lozenges for cough may be helpful  -Warm salt water gargles and tea with honey   -Obtain a pulse ox device and check your O2 1-2 times a day  You want your oxygen levels to be >93%  If they go below 93% go to the ED immediately  -Vitamin C and D daily  You can attempt to use zinc or Zicam    -PCP recheck in 2-3 days  ED precautions and red flag signs discussed in depth

## 2023-01-27 NOTE — LETTER
January 27, 2023     Patient: David Pham   YOB: 1975   Date of Visit: 1/27/2023       To Whom It May Concern: It is my medical opinion that David Pham should remain out of work until 1/29/23  If you have any questions or concerns, please don't hesitate to call           Sincerely,        Diane Cleveland PA-C    CC: No Recipients

## 2023-01-27 NOTE — PROGRESS NOTES
[address]        NAME: David Pham is a 50 y o  male  : 1975    MRN: [id number]  DATE: 2023  TIME: 9:57 AM    Assessment and Plan   Acute cough [R05 1]  1  Acute cough  XR chest pa & lateral    Covid/Flu-Office Collect    benzonatate (TESSALON PERLES) 100 mg capsule    doxycycline monohydrate (MONODOX) 100 mg capsule      2  Elevated BP without diagnosis of hypertension              Patient Instructions   Acute Cough:   -PCR COVID and Flu ordered   -The Xray showed possible consolidation over the right lower lobe  Awaiting official read  Will place the patient on doxycyline 100mg PO BID x 7 days  Take with meals  Tessalon perles for the coughing    -Stay very well hydrated and push fluids  -Run a humidifier by your bed and take steam showers to clear mucus   -Zicam nasal AllClear can be soothing to the nasal passages   -You can use flonase once daily for two weeks   -Advil or Tylenol for fever or pain  -Mucinex OTC or Zyrtec or Claritin  Drink plenty of water with the mucinex    -Honey or throat lozenges for cough may be helpful  -Warm salt water gargles and tea with honey   -Obtain a pulse ox device and check your O2 1-2 times a day  You want your oxygen levels to be >93%  If they go below 93% go to the ED immediately  -Vitamin C and D daily  You can attempt to use zinc or Zicam    -PCP recheck in 2-3 days  ED precautions and red flag signs discussed in depth  Elevated BP reading: DASH diet, no caffeine, alcohol was discussed  Take your BP twice daily and keep a log and bring it in when you see your PCP for follow up  If your BP is >180/100 go to the ED especially if you are experiencing chest pain, dizziness, headache  Follow up with PCP in 3-5 days  Proceed to  ER if symptoms worsen  Chief Complaint     Chief Complaint   Patient presents with   • Cough     Pt reports of worsening cough and congestion with exertional SOB            History of Present Illness The patient is a 43-year-old male who presents today for worsening loss of smell, coughing with exertional dyspnea x 1 week  The patient states that his sx began with rhinorrhea, post nasal drainage and nasal congestion that have been worsening with hoarseness, coughing and dyspnea  He states that the dyspnea began three days ago while ambulating up the stairs  The cough is also keeping him up at night  The dyspnea improves with rest  No fever, chills, body aches  No wheezing, chest tightness, cp, palpitations  No dizziness or weakness  No headache  No GI sx  Good PO intake  No loss of taste or smell  No lower extremity edema  No hx of asthma or smoking  He has been taking Tylenol and Claritin with no relief  Review of Systems   Review of Systems   Constitutional: Positive for fatigue  Negative for activity change, appetite change, chills, diaphoresis and fever  HENT: Positive for congestion and rhinorrhea  Negative for ear discharge, ear pain, facial swelling, hearing loss, postnasal drip, sinus pressure, sinus pain, sore throat, tinnitus, trouble swallowing and voice change  Eyes: Negative for visual disturbance  Respiratory: Positive for cough and shortness of breath  Negative for apnea, chest tightness, wheezing and stridor  Cardiovascular: Negative for chest pain, palpitations and leg swelling  Gastrointestinal: Negative for abdominal distention and abdominal pain  Genitourinary: Negative for decreased urine volume  Musculoskeletal: Negative for arthralgias, joint swelling, myalgias, neck pain and neck stiffness  Skin: Negative for rash  Allergic/Immunologic: Negative for immunocompromised state  Neurological: Negative for dizziness, weakness, light-headedness, numbness and headaches  Hematological: Negative for adenopathy           Current Medications       Current Outpatient Medications:   •  benzonatate (TESSALON PERLES) 100 mg capsule, Take 1 capsule (100 mg total) by mouth 3 (three) times a day as needed for cough, Disp: 20 capsule, Rfl: 0  •  doxycycline monohydrate (MONODOX) 100 mg capsule, Take 1 capsule (100 mg total) by mouth 2 (two) times a day for 7 days, Disp: 14 capsule, Rfl: 0  •  colchicine (COLCRYS) 0 6 mg tablet, Take 1 tablet (0 6 mg total) by mouth 3 (three) times a day as needed (gout attacks), Disp: 30 tablet, Rfl: 2  •  Multiple Vitamin (MULTI VITAMIN DAILY PO), Take by mouth, Disp: , Rfl:     Current Allergies     Allergies as of 01/27/2023   • (No Known Allergies)            The following portions of the patient's history were reviewed and updated as appropriate: allergies, current medications, past family history, past medical history, past social history, past surgical history and problem list      Past Medical History:   Diagnosis Date   • Overweight     last assessed: 7/28/2017       History reviewed  No pertinent surgical history  Family History   Problem Relation Age of Onset   • No Known Problems Mother    • Hypertension Father    • Prostate cancer Father          Medications have been verified  Objective   /100 Comment: Manual  Pulse 99   Temp 99 1 °F (37 3 °C)   Resp 20   Ht 5' 9" (1 753 m)   Wt 72 6 kg (160 lb)   SpO2 96%   BMI 23 63 kg/m²   No LMP for male patient         Physical Exam     Physical Exam

## 2023-01-29 LAB
FLUAV RNA RESP QL NAA+PROBE: NEGATIVE
FLUBV RNA RESP QL NAA+PROBE: NEGATIVE
SARS-COV-2 RNA RESP QL NAA+PROBE: NEGATIVE

## 2023-01-31 ENCOUNTER — TELEPHONE (OUTPATIENT)
Dept: FAMILY MEDICINE CLINIC | Facility: CLINIC | Age: 48
End: 2023-01-31

## 2023-01-31 ENCOUNTER — OFFICE VISIT (OUTPATIENT)
Dept: FAMILY MEDICINE CLINIC | Facility: CLINIC | Age: 48
End: 2023-01-31

## 2023-01-31 VITALS
WEIGHT: 163 LBS | HEART RATE: 81 BPM | OXYGEN SATURATION: 97 % | BODY MASS INDEX: 24.14 KG/M2 | HEIGHT: 69 IN | SYSTOLIC BLOOD PRESSURE: 162 MMHG | TEMPERATURE: 97.3 F | RESPIRATION RATE: 16 BRPM | DIASTOLIC BLOOD PRESSURE: 90 MMHG

## 2023-01-31 DIAGNOSIS — Z13.1 SCREENING FOR DIABETES MELLITUS (DM): ICD-10-CM

## 2023-01-31 DIAGNOSIS — Z13.83 SCREENING FOR CARDIOVASCULAR, RESPIRATORY, AND GENITOURINARY DISEASES: ICD-10-CM

## 2023-01-31 DIAGNOSIS — J20.9 ACUTE BRONCHITIS, UNSPECIFIED ORGANISM: ICD-10-CM

## 2023-01-31 DIAGNOSIS — M1A.0790 CHRONIC IDIOPATHIC GOUT INVOLVING TOE WITHOUT TOPHUS, UNSPECIFIED LATERALITY: ICD-10-CM

## 2023-01-31 DIAGNOSIS — Z12.5 PROSTATE CANCER SCREENING: ICD-10-CM

## 2023-01-31 DIAGNOSIS — Z13.89 SCREENING FOR CARDIOVASCULAR, RESPIRATORY, AND GENITOURINARY DISEASES: ICD-10-CM

## 2023-01-31 DIAGNOSIS — R03.0 ELEVATED BP WITHOUT DIAGNOSIS OF HYPERTENSION: ICD-10-CM

## 2023-01-31 DIAGNOSIS — Z13.6 SCREENING FOR CARDIOVASCULAR, RESPIRATORY, AND GENITOURINARY DISEASES: ICD-10-CM

## 2023-01-31 DIAGNOSIS — Z12.11 COLON CANCER SCREENING: Primary | ICD-10-CM

## 2023-01-31 PROBLEM — R05.1 ACUTE COUGH: Status: RESOLVED | Noted: 2023-01-27 | Resolved: 2023-01-31

## 2023-01-31 RX ORDER — HYDROCODONE POLISTIREX AND CHLORPHENIRAMINE POLISTIREX 10; 8 MG/5ML; MG/5ML
5 SUSPENSION, EXTENDED RELEASE ORAL
Qty: 115 ML | Refills: 0 | Status: SHIPPED | OUTPATIENT
Start: 2023-01-31 | End: 2023-02-01

## 2023-01-31 RX ORDER — AMOXICILLIN AND CLAVULANATE POTASSIUM 500; 125 MG/1; MG/1
1 TABLET, FILM COATED ORAL EVERY 8 HOURS SCHEDULED
Qty: 30 TABLET | Refills: 0 | Status: SHIPPED | OUTPATIENT
Start: 2023-01-31 | End: 2023-02-05

## 2023-01-31 RX ORDER — AMOXICILLIN AND CLAVULANATE POTASSIUM 875; 125 MG/1; MG/1
1 TABLET, FILM COATED ORAL 2 TIMES DAILY
Qty: 20 TABLET | Refills: 0 | Status: SHIPPED | OUTPATIENT
Start: 2023-01-31 | End: 2023-01-31

## 2023-01-31 NOTE — TELEPHONE ENCOUNTER
[facility] pharmacy calling they do not have the dose in stock ordered by Dr Jorje Sosa  She is asking if it can be changed to 500 2x day or 3x day  [phone]  Please advise

## 2023-01-31 NOTE — PATIENT INSTRUCTIONS
Please stop the doxycycline and use the augmentin instead  Use mucinex DM for mucus and cough as this does not affect blood pressure  Get labs done in about 2 weeks and we will check you and your blood pressure in 1 month    Try to drink more water and avoid salt and caffeine so we can see if your blood pressure needs to be diagnosed and treated, especially if it remains over 140/90

## 2023-02-01 DIAGNOSIS — Z00.00 HEALTHCARE MAINTENANCE: Primary | ICD-10-CM

## 2023-02-01 DIAGNOSIS — R05.1 ACUTE COUGH: ICD-10-CM

## 2023-02-01 RX ORDER — DEXTROMETHORPHAN HYDROBROMIDE AND PROMETHAZINE HYDROCHLORIDE 15; 6.25 MG/5ML; MG/5ML
5 SOLUTION ORAL 4 TIMES DAILY PRN
Qty: 120 ML | Refills: 0 | Status: SHIPPED | OUTPATIENT
Start: 2023-02-01 | End: 2023-02-05

## 2023-02-03 ENCOUNTER — TELEPHONE (OUTPATIENT)
Dept: FAMILY MEDICINE CLINIC | Facility: CLINIC | Age: 48
End: 2023-02-03

## 2023-02-03 NOTE — TELEPHONE ENCOUNTER
sw pt  Advised he may be sick (cough, congestion, sob) but I cannot say he is disabled  Also claims he has htn and cannot work so I advised he be started on medication for hypertension but he is again not disabled  He states his employer advised him to file disability for his illness, I told him I could offer him an FMLA statement but not disability, short or long term  He states he is not feeling well so I said he should be rechecked or go to the ER if he is very SOB or CP but not go simply to go there as they will not do disability for him  I stated he does not seem SOB as he is yelling at me  I again stated that I will not certify any type of disability for him for his current condition  He states he will find another doctor  I told him he can request his records if he chooses  He states he will call his   I said OK

## 2023-02-04 ENCOUNTER — HOSPITAL ENCOUNTER (EMERGENCY)
Facility: HOSPITAL | Age: 48
Discharge: HOME | End: 2023-02-04
Attending: EMERGENCY MEDICINE

## 2023-02-04 ENCOUNTER — OFFICE VISIT (OUTPATIENT)
Dept: URGENT CARE | Facility: CLINIC | Age: 48
End: 2023-02-04

## 2023-02-04 VITALS
RESPIRATION RATE: 18 BRPM | BODY MASS INDEX: 23.63 KG/M2 | OXYGEN SATURATION: 96 % | TEMPERATURE: 97.4 F | HEART RATE: 97 BPM | DIASTOLIC BLOOD PRESSURE: 104 MMHG | SYSTOLIC BLOOD PRESSURE: 187 MMHG | WEIGHT: 160 LBS

## 2023-02-04 VITALS
BODY MASS INDEX: 23.7 KG/M2 | SYSTOLIC BLOOD PRESSURE: 180 MMHG | HEART RATE: 119 BPM | OXYGEN SATURATION: 97 % | WEIGHT: 160 LBS | DIASTOLIC BLOOD PRESSURE: 110 MMHG | TEMPERATURE: 98.1 F | HEIGHT: 69 IN | RESPIRATION RATE: 20 BRPM

## 2023-02-04 DIAGNOSIS — J06.9 URI (UPPER RESPIRATORY INFECTION): Primary | ICD-10-CM

## 2023-02-04 DIAGNOSIS — I16.0 HYPERTENSIVE URGENCY: Primary | ICD-10-CM

## 2023-02-04 DIAGNOSIS — I10 HIGH BLOOD PRESSURE: ICD-10-CM

## 2023-02-04 LAB
ALBUMIN SERPL BCP-MCNC: 3.9 G/DL (ref 3.5–5)
ALP SERPL-CCNC: 66 U/L (ref 46–116)
ALT SERPL W P-5'-P-CCNC: 55 U/L (ref 12–78)
ANION GAP SERPL CALCULATED.3IONS-SCNC: 10 MMOL/L (ref 4–13)
AST SERPL W P-5'-P-CCNC: 28 U/L (ref 5–45)
BASOPHILS # BLD AUTO: 0.21 THOUSANDS/ÂΜL (ref 0–0.1)
BASOPHILS NFR BLD AUTO: 2 % (ref 0–1)
BILIRUB SERPL-MCNC: 0.25 MG/DL (ref 0.2–1)
BUN SERPL-MCNC: 18 MG/DL (ref 5–25)
CALCIUM SERPL-MCNC: 9.1 MG/DL (ref 8.3–10.1)
CHLORIDE SERPL-SCNC: 106 MMOL/L (ref 96–108)
CO2 SERPL-SCNC: 24 MMOL/L (ref 21–32)
CREAT SERPL-MCNC: 1.3 MG/DL (ref 0.6–1.3)
EOSINOPHIL # BLD AUTO: 2.33 THOUSAND/ÂΜL (ref 0–0.61)
EOSINOPHIL NFR BLD AUTO: 24 % (ref 0–6)
ERYTHROCYTE [DISTWIDTH] IN BLOOD BY AUTOMATED COUNT: 12.7 % (ref 11.6–15.1)
GFR SERPL CREATININE-BSD FRML MDRD: 64 ML/MIN/1.73SQ M
GLUCOSE SERPL-MCNC: 111 MG/DL (ref 65–140)
HCT VFR BLD AUTO: 47.6 % (ref 36.5–49.3)
HGB BLD-MCNC: 16.4 G/DL (ref 12–17)
IMM GRANULOCYTES # BLD AUTO: 0.04 THOUSAND/UL (ref 0–0.2)
IMM GRANULOCYTES NFR BLD AUTO: 0 % (ref 0–2)
LYMPHOCYTES # BLD AUTO: 2.02 THOUSANDS/ÂΜL (ref 0.6–4.47)
LYMPHOCYTES NFR BLD AUTO: 21 % (ref 14–44)
MCH RBC QN AUTO: 30.6 PG (ref 26.8–34.3)
MCHC RBC AUTO-ENTMCNC: 34.5 G/DL (ref 31.4–37.4)
MCV RBC AUTO: 89 FL (ref 82–98)
MONOCYTES # BLD AUTO: 0.93 THOUSAND/ÂΜL (ref 0.17–1.22)
MONOCYTES NFR BLD AUTO: 10 % (ref 4–12)
NEUTROPHILS # BLD AUTO: 4.23 THOUSANDS/ÂΜL (ref 1.85–7.62)
NEUTS SEG NFR BLD AUTO: 43 % (ref 43–75)
NRBC BLD AUTO-RTO: 0 /100 WBCS
PLATELET # BLD AUTO: 244 THOUSANDS/UL (ref 149–390)
PMV BLD AUTO: 9.3 FL (ref 8.9–12.7)
POTASSIUM SERPL-SCNC: 3.7 MMOL/L (ref 3.5–5.3)
PROT SERPL-MCNC: 7.8 G/DL (ref 6.4–8.4)
RBC # BLD AUTO: 5.36 MILLION/UL (ref 3.88–5.62)
SODIUM SERPL-SCNC: 140 MMOL/L (ref 135–147)
WBC # BLD AUTO: 9.76 THOUSAND/UL (ref 4.31–10.16)

## 2023-02-04 NOTE — ED PROVIDER NOTES
History  Chief Complaint   Patient presents with   • Hypertension     Pt been running high on his bp 950 systolic neck pain and headache on and off  Pt saw PCP jan 31 and bp was 160/90 and wanst given anything for his bp then     Patient states he had a recurrence of viral type symptoms over the last 2 months  Patient was sick with cough and congestion for 2 weeks  He did not seek medical attention but was taking OTC cough medicines  He states he improved but then got sick again  Second time he went to a walk-in [facility] and was noted to have elevated blood pressure  Patient was treated for presumed pneumonia  States his blood pressure remained high after that  Patient states he has been going to his PCP regularly and never had trouble with hypertension prior to this illness  He denies headache, dizziness or lightheadedness, no chest pain, no abdominal pain  Prior to Admission Medications   Prescriptions Last Dose Informant Patient Reported? Taking?    Multiple Vitamin (MULTI VITAMIN DAILY PO)   Yes No   Sig: Take by mouth   Promethazine-DM (PHENERGAN-DM) 6 25-15 mg/5 mL oral syrup   No No   Sig: Take 5 mL by mouth 4 (four) times a day as needed for cough   Patient not taking: Reported on 2/4/2023   amoxicillin-clavulanate (Augmentin) 500-125 mg per tablet   No No   Sig: Take 1 tablet by mouth every 8 (eight) hours for 10 days   Patient not taking: Reported on 2/4/2023   benzonatate (TESSALON PERLES) 100 mg capsule   No No   Sig: Take 1 capsule (100 mg total) by mouth 3 (three) times a day as needed for cough   Patient not taking: Reported on 2/4/2023   colchicine (COLCRYS) 0 6 mg tablet   No No   Sig: Take 1 tablet (0 6 mg total) by mouth 3 (three) times a day as needed (gout attacks)   Patient not taking: Reported on 1/31/2023   doxycycline monohydrate (MONODOX) 100 mg capsule   No No   Sig: Take 1 capsule (100 mg total) by mouth 2 (two) times a day for 7 days      Facility-Administered Medications: None       Past Medical History:   Diagnosis Date   • Overweight     last assessed: 7/28/2017       No past surgical history on file  Family History   Problem Relation Age of Onset   • No Known Problems Mother    • Hypertension Father    • Prostate cancer Father      I have reviewed and agree with the history as documented  E-Cigarette/Vaping     E-Cigarette/Vaping Substances     Social History     Tobacco Use   • Smoking status: Never   • Smokeless tobacco: Never       Review of Systems   Constitutional: Negative for chills and fever  HENT: Positive for congestion, sinus pressure and sore throat  Eyes: Negative for visual disturbance  Respiratory: Positive for cough  Negative for shortness of breath  Cardiovascular: Negative for chest pain, palpitations and leg swelling  Gastrointestinal: Negative for abdominal pain and vomiting  Genitourinary: Negative for difficulty urinating and dysuria  Musculoskeletal: Negative for arthralgias and myalgias  Skin: Negative for rash  Neurological: Negative for dizziness, weakness, light-headedness and headaches  Hematological: Does not bruise/bleed easily  Psychiatric/Behavioral: Negative for confusion  All other systems reviewed and are negative  Physical Exam  Physical Exam  Vitals and nursing note reviewed  Constitutional:       Appearance: Normal appearance  He is normal weight  HENT:      Head: Normocephalic  Right Ear: External ear normal       Left Ear: External ear normal       Nose: Nose normal       Mouth/Throat:      Pharynx: Oropharynx is clear  Eyes:      Conjunctiva/sclera: Conjunctivae normal    Cardiovascular:      Rate and Rhythm: Normal rate and regular rhythm  Pulses: Normal pulses  Heart sounds: Normal heart sounds  Pulmonary:      Effort: Pulmonary effort is normal    Abdominal:      Palpations: Abdomen is soft  Tenderness: There is no abdominal tenderness     Musculoskeletal: General: Normal range of motion  Cervical back: Normal range of motion  Skin:     General: Skin is warm and dry  Capillary Refill: Capillary refill takes less than 2 seconds  Neurological:      General: No focal deficit present  Mental Status: He is alert and oriented to person, place, and time     Psychiatric:         Mood and Affect: Mood normal          Behavior: Behavior normal          Vital Signs  ED Triage Vitals [02/04/23 1620]   Temperature Pulse Respirations Blood Pressure SpO2   (!) 97 4 °F (36 3 °C) (!) 111 20 (!) 162/104 98 %      Temp src Heart Rate Source Patient Position - Orthostatic VS BP Location FiO2 (%)   -- Monitor -- Right arm --      Pain Score       --           Vitals:    02/04/23 1620 02/04/23 1744   BP: (!) 162/104 (!) 187/104   Pulse: (!) 111 89         Visual Acuity      ED Medications  Medications - No data to display    Diagnostic Studies  Results Reviewed     Procedure Component Value Units Date/Time    Comprehensive metabolic panel [[id number]] Collected: 02/04/23 1712    Lab Status: Final result Specimen: Blood from Line, Venous Updated: 02/04/23 1735     Sodium 140 mmol/L      Potassium 3 7 mmol/L      Chloride 106 mmol/L      CO2 24 mmol/L      ANION GAP 10 mmol/L      BUN 18 mg/dL      Creatinine 1 30 mg/dL      Glucose 111 mg/dL      Calcium 9 1 mg/dL      AST 28 U/L      ALT 55 U/L      Alkaline Phosphatase 66 U/L      Total Protein 7 8 g/dL      Albumin 3 9 g/dL      Total Bilirubin 0 25 mg/dL      eGFR 64 ml/min/1 73sq m     Narrative:      [place] guidelines for Chronic Kidney Disease (CKD):   •  Stage 1 with normal or high GFR (GFR > 90 mL/min/1 73 square meters)  •  Stage 2 Mild CKD (GFR = 60-89 mL/min/1 73 square meters)  •  Stage 3A Moderate CKD (GFR = 45-59 mL/min/1 73 square meters)  •  Stage 3B Moderate CKD (GFR = 30-44 mL/min/1 73 square meters)  •  Stage 4 Severe CKD (GFR = 15-29 mL/min/1 73 square meters)  •  Stage 5 End Stage CKD (GFR <15 mL/min/1 73 square meters)  Note: GFR calculation is accurate only with a steady state creatinine    CBC and differential [[id number]]  (Abnormal) Collected: 02/04/23 1712    Lab Status: Final result Specimen: Blood from Line, Venous Updated: 02/04/23 1717     WBC 9 76 Thousand/uL      RBC 5 36 Million/uL      Hemoglobin 16 4 g/dL      Hematocrit 47 6 %      MCV 89 fL      MCH 30 6 pg      MCHC 34 5 g/dL      RDW 12 7 %      MPV 9 3 fL      Platelets 915 Thousands/uL      nRBC 0 /100 WBCs      Neutrophils Relative 43 %      Immat GRANS % 0 %      Lymphocytes Relative 21 %      Monocytes Relative 10 %      Eosinophils Relative 24 %      Basophils Relative 2 %      Neutrophils Absolute 4 23 Thousands/µL      Immature Grans Absolute 0 04 Thousand/uL      Lymphocytes Absolute 2 02 Thousands/µL      Monocytes Absolute 0 93 Thousand/µL      Eosinophils Absolute 2 33 Thousand/µL      Basophils Absolute 0 21 Thousands/µL     UA (URINE) with reflex to Scope [[id number]]     Lab Status: No result Specimen: Urine                  No orders to display              Procedures  ECG 12 Lead Documentation Only    Date/Time: 2/4/2023 5:05 PM  Performed by: Lolita Patel MD  Authorized by: Lolita Patel MD     Indications / Diagnosis:  Hypertension  ECG reviewed by me, the ED Provider: yes    Patient location:  ED  Interpretation:     Interpretation: normal    Rate:     ECG rate:  86    ECG rate assessment: normal    Rhythm:     Rhythm: sinus rhythm    Ectopy:     Ectopy: none    QRS:     QRS axis:  Normal    QRS intervals:  Normal  Conduction:     Conduction: normal    ST segments:     ST segments:  Normal  T waves:     T waves: normal               ED Course                                             Medical Decision Making  Patient has no prior history of hypertension  He has elevated recordings ever since his recent illness  We will check a metabolic and cardiac profile    I have advised patient to monitor his blood pressure when he feels well and is away from doctors  He should monitor and record 3 different dates    High blood pressure: acute illness or injury  URI (upper respiratory infection): acute illness or injury  Amount and/or Complexity of Data Reviewed  Labs: ordered  Disposition  Final diagnoses:   URI (upper respiratory infection)   High blood pressure     Time reflects when diagnosis was documented in both MDM as applicable and the Disposition within this note     Time User Action Codes Description Comment    2/4/2023  5:44 PM Jose Luis Vasquez Add [J06 9] URI (upper respiratory infection)     2/4/2023  5:44 PM Kami TITUS Add [I10] High blood pressure       ED Disposition     ED Disposition   Discharge    Condition   Stable    Date/Time   Sat Feb 4, 2023  5:44 PM    Comment   Francis Espana discharge to home/self care  Follow-up Information     Follow up With Specialties Details Why [facility] Schedule an appointment as soon as possible for a visit in 1 day  [address]  [phone]            Patient's Medications   Discharge Prescriptions    No medications on file       No discharge procedures on file      PDMP Review       Value Time User    PDMP Reviewed  Yes 1/31/2023  6:22 PM Sukumar Seen, DO          ED Provider  Electronically Signed by           Hira Ramirez MD  02/04/23 9114

## 2023-02-04 NOTE — PROGRESS NOTES
[address] [facility]        NAME: Zohreh Aguilar is a 50 y o  male  : 1975    MRN: [id number]  DATE: 2023  TIME: 2:03 PM    Assessment and Plan   Hypertensive urgency [I16 0]  1  Hypertensive urgency  Transfer to other facility        Patient Instructions   Hypertensive urgency  Needs to be lowered today rather than days/weeks with oral  Pt expressed understanding  To ER for evaluation and treatment, calling wife to  and transport him to ER    Follow up with PCP in 3-5 days  Proceed to  ER if symptoms worsen  Chief Complaint     Chief Complaint   Patient presents with   • Hypertension     hypertension         History of Present Illness       Zo Yee is a 22-year-old male who presents to clinic due to increased blood pressure  He states his wife is a nurse and has been checking his blood pressure and has notices increasing over the last few weeks  He denies any headache or vision changes  He states he has never been formally diagnosed with high blood pressure and never been prescribed medications  He does not currently take any antihypertensive medications  He denies any chest pain or shortness of breath  Review of Systems   Review of Systems   Constitutional: Negative  Eyes: Negative for photophobia and visual disturbance  Respiratory: Negative for shortness of breath  Cardiovascular: Negative for chest pain  Neurological: Negative for dizziness and headaches           Current Medications       Current Outpatient Medications:   •  amoxicillin-clavulanate (Augmentin) 500-125 mg per tablet, Take 1 tablet by mouth every 8 (eight) hours for 10 days (Patient not taking: Reported on 2023), Disp: 30 tablet, Rfl: 0  •  benzonatate (TESSALON PERLES) 100 mg capsule, Take 1 capsule (100 mg total) by mouth 3 (three) times a day as needed for cough (Patient not taking: Reported on 2023), Disp: 20 capsule, Rfl: 0  •  colchicine (COLCRYS) 0 6 mg tablet, Take 1 tablet (0 6 mg total) by mouth 3 (three) times a day as needed (gout attacks) (Patient not taking: Reported on 1/31/2023), Disp: 30 tablet, Rfl: 2  •  Multiple Vitamin (MULTI VITAMIN DAILY PO), Take by mouth, Disp: , Rfl:   •  Promethazine-DM (PHENERGAN-DM) 6 25-15 mg/5 mL oral syrup, Take 5 mL by mouth 4 (four) times a day as needed for cough (Patient not taking: Reported on 2/4/2023), Disp: 120 mL, Rfl: 0    Current Allergies     Allergies as of 02/04/2023   • (No Known Allergies)            The following portions of the patient's history were reviewed and updated as appropriate: allergies, current medications, past family history, past medical history, past social history, past surgical history and problem list      Past Medical History:   Diagnosis Date   • Overweight     last assessed: 7/28/2017       No past surgical history on file  Family History   Problem Relation Age of Onset   • No Known Problems Mother    • Hypertension Father    • Prostate cancer Father          Medications have been verified  Objective   BP (!) 180/110   Pulse (!) 119   Temp 98 1 °F (36 7 °C)   Resp 20   Ht 5' 9" (1 753 m)   Wt 72 6 kg (160 lb)   SpO2 97%   BMI 23 63 kg/m²   No LMP for male patient  Physical Exam     Physical Exam  Vitals and nursing note reviewed  Constitutional:       General: He is not in acute distress  Appearance: Normal appearance  He is not ill-appearing  Cardiovascular:      Rate and Rhythm: Normal rate and regular rhythm  Heart sounds: Normal heart sounds  Pulmonary:      Effort: Pulmonary effort is normal       Breath sounds: Normal breath sounds  Neurological:      Mental Status: He is alert and oriented to person, place, and time     Psychiatric:         Mood and Affect: Mood normal          Behavior: Behavior normal

## 2023-02-06 LAB
ATRIAL RATE: 86 BPM
P AXIS: 73 DEGREES
PR INTERVAL: 138 MS
QRS AXIS: 79 DEGREES
QRSD INTERVAL: 82 MS
QT INTERVAL: 364 MS
QTC INTERVAL: 435 MS
T WAVE AXIS: 64 DEGREES
VENTRICULAR RATE: 86 BPM

## 2023-02-07 ENCOUNTER — OFFICE VISIT (OUTPATIENT)
Dept: FAMILY MEDICINE CLINIC | Facility: CLINIC | Age: 48
End: 2023-02-07

## 2023-02-07 VITALS
DIASTOLIC BLOOD PRESSURE: 104 MMHG | SYSTOLIC BLOOD PRESSURE: 160 MMHG | HEIGHT: 69 IN | WEIGHT: 159 LBS | HEART RATE: 114 BPM | RESPIRATION RATE: 16 BRPM | TEMPERATURE: 97.7 F | BODY MASS INDEX: 23.55 KG/M2 | OXYGEN SATURATION: 99 %

## 2023-02-07 DIAGNOSIS — M1A.9XX0 CHRONIC GOUT INVOLVING TOE WITHOUT TOPHUS, UNSPECIFIED CAUSE, UNSPECIFIED LATERALITY: ICD-10-CM

## 2023-02-07 DIAGNOSIS — I10 PRIMARY HYPERTENSION: Primary | ICD-10-CM

## 2023-02-07 RX ORDER — DEXTROMETHORPHN/ACETAMINOPH/CP 10-325-2MG
TABLET ORAL
COMMUNITY

## 2023-02-07 RX ORDER — METOPROLOL SUCCINATE 50 MG/1
50 TABLET, EXTENDED RELEASE ORAL DAILY
Qty: 90 TABLET | Refills: 0 | Status: SHIPPED | OUTPATIENT
Start: 2023-02-07

## 2023-02-07 RX ORDER — AMOXICILLIN AND CLAVULANATE POTASSIUM 500; 125 MG/1; MG/1
TABLET, FILM COATED ORAL
COMMUNITY
Start: 2023-02-02

## 2023-02-07 NOTE — PROGRESS NOTES
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 tophus, unspecified cause, unspecified laterality    Other orders  -     amoxicillin-clavulanate (AUGMENTIN) 500-125 mg per tablet  -     DM-APAP-CPM (Coricidin HBP) -2 MG TABS          Return in about 2 weeks (around 2/21/2023) for Recheck  Subjective:      Patient ID: David Pham is a 50 y o  male  Chief Complaint   Patient presents with   • Hypertension     Patient has been monitoring his bp at home and it has been high  klcma   • Dizziness       Hypertension  This is a new problem  The current episode started 1 to 4 weeks ago  The problem has been waxing and waning since onset  The problem is resistant  Associated symptoms include headaches, malaise/fatigue, neck pain and shortness of breath  Pertinent negatives include no anxiety, blurred vision, chest pain, orthopnea, palpitations, peripheral edema, PND or sweats  There are no associated agents to hypertension  Risk factors for coronary artery disease include family history and stress  There are no compliance problems  Home readings are high at rest  Wife is a nurse  Has been high  Lowest 160/107    F htn    The following portions of the patient's history were reviewed and updated as appropriate: allergies, current medications, past family history, past medical history, past social history, past surgical history and problem list     Review of Systems   Constitutional: Positive for malaise/fatigue  Eyes: Negative for blurred vision     Respiratory: Positive for shortness of breath  Cardiovascular: Negative for chest pain, palpitations, orthopnea and PND  Musculoskeletal: Positive for neck pain  Neurological: Positive for headaches  Current Outpatient Medications   Medication Sig Dispense Refill   • amoxicillin-clavulanate (AUGMENTIN) 500-125 mg per tablet      • DM-APAP-CPM (Coricidin HBP) -2 MG TABS      • metoprolol succinate (TOPROL-XL) 50 mg 24 hr tablet Take 1 tablet (50 mg total) by mouth daily 90 tablet 0   • Multiple Vitamin (MULTI VITAMIN DAILY PO) Take by mouth     • colchicine (COLCRYS) 0 6 mg tablet Take 1 tablet (0 6 mg total) by mouth 3 (three) times a day as needed (gout attacks) (Patient not taking: Reported on 1/31/2023) 30 tablet 2     No current facility-administered medications for this visit  Objective:    BP (!) 160/104   Pulse (!) 114   Temp 97 7 °F (36 5 °C)   Resp 16   Ht 5' 9" (1 753 m)   Wt 72 1 kg (159 lb)   SpO2 99%   BMI 23 48 kg/m²        Physical Exam  Vitals and nursing note reviewed  Constitutional:       General: He is not in acute distress  Appearance: He is well-developed  He is not ill-appearing  HENT:      Head: Normocephalic  Right Ear: Tympanic membrane normal       Left Ear: Tympanic membrane normal    Eyes:      General: No scleral icterus  Conjunctiva/sclera: Conjunctivae normal    Neck:      Vascular: No carotid bruit  Cardiovascular:      Rate and Rhythm: Regular rhythm  Tachycardia present  Heart sounds: No murmur heard  Pulmonary:      Effort: Pulmonary effort is normal  No respiratory distress  Abdominal:      Palpations: Abdomen is soft  Musculoskeletal:         General: No deformity  Cervical back: Neck supple  Right lower leg: No edema  Left lower leg: No edema  Skin:     General: Skin is warm and dry  Coloration: Skin is not pale  Neurological:      Mental Status: He is alert  Motor: No weakness        Gait: Gait normal  Psychiatric:         Mood and Affect: Mood normal          Behavior: Behavior normal          Thought Content:  Thought content normal                 Peggy Sullivan DO

## 2023-03-20 ENCOUNTER — HOSPITAL ENCOUNTER (EMERGENCY)
Facility: HOSPITAL | Age: 48
Discharge: HOME | End: 2023-03-21
Attending: EMERGENCY MEDICINE

## 2023-03-20 DIAGNOSIS — J18.9 PNEUMONITIS: ICD-10-CM

## 2023-03-20 DIAGNOSIS — R06.02 SOB (SHORTNESS OF BREATH): Primary | ICD-10-CM

## 2023-03-20 LAB
BASOPHILS # BLD AUTO: 0.15 THOUSANDS/ÂΜL (ref 0–0.1)
BASOPHILS NFR BLD AUTO: 1 % (ref 0–1)
EOSINOPHIL # BLD AUTO: 4.22 THOUSAND/ÂΜL (ref 0–0.61)
EOSINOPHIL NFR BLD AUTO: 34 % (ref 0–6)
ERYTHROCYTE [DISTWIDTH] IN BLOOD BY AUTOMATED COUNT: 12.4 % (ref 11.6–15.1)
HCT VFR BLD AUTO: 45.8 % (ref 36.5–49.3)
HGB BLD-MCNC: 16 G/DL (ref 12–17)
IMM GRANULOCYTES # BLD AUTO: 0.05 THOUSAND/UL (ref 0–0.2)
IMM GRANULOCYTES NFR BLD AUTO: 0 % (ref 0–2)
LYMPHOCYTES # BLD AUTO: 2.46 THOUSANDS/ÂΜL (ref 0.6–4.47)
LYMPHOCYTES NFR BLD AUTO: 20 % (ref 14–44)
MCH RBC QN AUTO: 30.7 PG (ref 26.8–34.3)
MCHC RBC AUTO-ENTMCNC: 34.9 G/DL (ref 31.4–37.4)
MCV RBC AUTO: 88 FL (ref 82–98)
MONOCYTES # BLD AUTO: 1.08 THOUSAND/ÂΜL (ref 0.17–1.22)
MONOCYTES NFR BLD AUTO: 9 % (ref 4–12)
NEUTROPHILS # BLD AUTO: 4.38 THOUSANDS/ÂΜL (ref 1.85–7.62)
NEUTS SEG NFR BLD AUTO: 36 % (ref 43–75)
NRBC BLD AUTO-RTO: 0 /100 WBCS
PLATELET # BLD AUTO: 322 THOUSANDS/UL (ref 149–390)
PMV BLD AUTO: 8.7 FL (ref 8.9–12.7)
RBC # BLD AUTO: 5.22 MILLION/UL (ref 3.88–5.62)
WBC # BLD AUTO: 12.34 THOUSAND/UL (ref 4.31–10.16)

## 2023-03-20 RX ORDER — LABETALOL HYDROCHLORIDE 5 MG/ML
20 INJECTION, SOLUTION INTRAVENOUS ONCE
Status: COMPLETED | OUTPATIENT
Start: 2023-03-20 | End: 2023-03-20

## 2023-03-20 RX ORDER — IPRATROPIUM BROMIDE AND ALBUTEROL SULFATE 2.5; .5 MG/3ML; MG/3ML
3 SOLUTION RESPIRATORY (INHALATION)
Status: DISCONTINUED | OUTPATIENT
Start: 2023-03-21 | End: 2023-03-20

## 2023-03-20 RX ORDER — IPRATROPIUM BROMIDE AND ALBUTEROL SULFATE 2.5; .5 MG/3ML; MG/3ML
SOLUTION RESPIRATORY (INHALATION)
Status: DISCONTINUED
Start: 2023-03-20 | End: 2023-03-21 | Stop reason: HOSPADM

## 2023-03-20 RX ORDER — IPRATROPIUM BROMIDE AND ALBUTEROL SULFATE 2.5; .5 MG/3ML; MG/3ML
3 SOLUTION RESPIRATORY (INHALATION) ONCE
Status: COMPLETED | OUTPATIENT
Start: 2023-03-21 | End: 2023-03-20

## 2023-03-20 RX ADMIN — LABETALOL HYDROCHLORIDE 20 MG: 5 INJECTION, SOLUTION INTRAVENOUS at 23:43

## 2023-03-20 RX ADMIN — IPRATROPIUM BROMIDE AND ALBUTEROL SULFATE 3 ML: .5; 3 SOLUTION RESPIRATORY (INHALATION) at 23:56

## 2023-03-20 NOTE — Clinical Note
Kervin Mesa was seen and treated in our emergency department on 3/20/2023  Diagnosis:     Amos Valdez  may return to work on return date  He may return on this date: 03/24/2023         If you have any questions or concerns, please don't hesitate to call        Kat Cavanaugh MD    ______________________________           _______________          _______________  Hospital Representative                              Date                                Time

## 2023-03-21 ENCOUNTER — APPOINTMENT (EMERGENCY)
Dept: RADIOLOGY | Facility: HOSPITAL | Age: 48
End: 2023-03-21

## 2023-03-21 VITALS
OXYGEN SATURATION: 98 % | RESPIRATION RATE: 19 BRPM | TEMPERATURE: 97.5 F | SYSTOLIC BLOOD PRESSURE: 143 MMHG | HEART RATE: 84 BPM | WEIGHT: 151.24 LBS | BODY MASS INDEX: 22.33 KG/M2 | DIASTOLIC BLOOD PRESSURE: 93 MMHG

## 2023-03-21 LAB
2HR DELTA HS TROPONIN: 3 NG/L
ALBUMIN SERPL BCP-MCNC: 4.5 G/DL (ref 3.5–5)
ALP SERPL-CCNC: 48 U/L (ref 34–104)
ALT SERPL W P-5'-P-CCNC: 29 U/L (ref 7–52)
ANION GAP SERPL CALCULATED.3IONS-SCNC: 10 MMOL/L (ref 4–13)
AST SERPL W P-5'-P-CCNC: 20 U/L (ref 13–39)
ATRIAL RATE: 80 BPM
BILIRUB SERPL-MCNC: 0.42 MG/DL (ref 0.2–1)
BNP SERPL-MCNC: 13 PG/ML (ref 0–100)
BUN SERPL-MCNC: 18 MG/DL (ref 5–25)
CALCIUM SERPL-MCNC: 9.4 MG/DL (ref 8.4–10.2)
CARDIAC TROPONIN I PNL SERPL HS: 5 NG/L
CARDIAC TROPONIN I PNL SERPL HS: 8 NG/L
CHLORIDE SERPL-SCNC: 102 MMOL/L (ref 96–108)
CO2 SERPL-SCNC: 25 MMOL/L (ref 21–32)
CREAT SERPL-MCNC: 0.98 MG/DL (ref 0.6–1.3)
FLUAV RNA RESP QL NAA+PROBE: NEGATIVE
FLUBV RNA RESP QL NAA+PROBE: NEGATIVE
GFR SERPL CREATININE-BSD FRML MDRD: 90 ML/MIN/1.73SQ M
GLUCOSE SERPL-MCNC: 102 MG/DL (ref 65–140)
P AXIS: 73 DEGREES
POTASSIUM SERPL-SCNC: 3.4 MMOL/L (ref 3.5–5.3)
PR INTERVAL: 152 MS
PROT SERPL-MCNC: 8 G/DL (ref 6.4–8.4)
QRS AXIS: 77 DEGREES
QRSD INTERVAL: 78 MS
QT INTERVAL: 390 MS
QTC INTERVAL: 449 MS
RSV RNA RESP QL NAA+PROBE: NEGATIVE
SARS-COV-2 RNA RESP QL NAA+PROBE: NEGATIVE
SODIUM SERPL-SCNC: 137 MMOL/L (ref 135–147)
T WAVE AXIS: 65 DEGREES
VENTRICULAR RATE: 80 BPM

## 2023-03-21 RX ORDER — METHYLPREDNISOLONE SODIUM SUCCINATE 125 MG/2ML
125 INJECTION, POWDER, LYOPHILIZED, FOR SOLUTION INTRAMUSCULAR; INTRAVENOUS ONCE
Status: COMPLETED | OUTPATIENT
Start: 2023-03-21 | End: 2023-03-21

## 2023-03-21 RX ORDER — METHYLPREDNISOLONE 4 MG/1
TABLET ORAL
Qty: 21 TABLET | Refills: 0 | Status: SHIPPED | OUTPATIENT
Start: 2023-03-21

## 2023-03-21 RX ORDER — IPRATROPIUM BROMIDE AND ALBUTEROL SULFATE 2.5; .5 MG/3ML; MG/3ML
3 SOLUTION RESPIRATORY (INHALATION)
Status: DISCONTINUED | OUTPATIENT
Start: 2023-03-21 | End: 2023-03-21 | Stop reason: HOSPADM

## 2023-03-21 RX ORDER — ALBUTEROL SULFATE 90 UG/1
2 AEROSOL, METERED RESPIRATORY (INHALATION) EVERY 6 HOURS PRN
Qty: 8.5 G | Refills: 0 | Status: SHIPPED | OUTPATIENT
Start: 2023-03-21

## 2023-03-21 RX ADMIN — IPRATROPIUM BROMIDE AND ALBUTEROL SULFATE 3 ML: .5; 3 SOLUTION RESPIRATORY (INHALATION) at 01:59

## 2023-03-21 RX ADMIN — METHYLPREDNISOLONE SODIUM SUCCINATE 125 MG: 125 INJECTION, POWDER, FOR SOLUTION INTRAMUSCULAR; INTRAVENOUS at 00:28

## 2023-03-21 NOTE — DISCHARGE INSTRUCTIONS
Take all other medications as directed  Follow-up with pulmonary at the number provided in the next 24 to 48 hours  You should wear a respirator at work for possible environmental exposures  Return to the emergency department anytime for any new or worsening issues

## 2023-03-21 NOTE — ED PROVIDER NOTES
History  Chief Complaint   Patient presents with   • Shortness of Breath     Started tonight around [phone]  Per wife at bedside has been having difficulty breathing every time he comes home from work since Arthur  Believes its the fumes at work  Tonight started suddenly  No respiratory history  Patient has been seen 4 times for acute onset of shortness of breath and difficulty breathing  He seems to believe that there may be something that he is in having at work and states that all of the stuff started after that possible work exposure  He has been seen in the emergency department 3 times and evaluated with a full work-up  History provided by:  Patient and relative   used: No    Shortness of Breath  Severity:  Moderate  Onset quality:  Gradual  Duration:  4 hours  Timing:  Constant  Progression:  Worsening  Chronicity:  Recurrent  Context: not activity, not URI and not weather changes    Relieved by:  Nothing  Worsened by:  Exertion  Ineffective treatments:  None tried  Associated symptoms: cough and sputum production    Associated symptoms: no abdominal pain, no chest pain, no claudication, no diaphoresis, no fever, no hemoptysis, no neck pain, no rash, no sore throat, no vomiting and no wheezing        Prior to Admission Medications   Prescriptions Last Dose Informant Patient Reported? Taking?    DM-APAP-CPM (Coricidin HBP) -2 MG TABS   Yes No   Multiple Vitamin (MULTI VITAMIN DAILY PO)   Yes No   Sig: Take by mouth   amoxicillin-clavulanate (AUGMENTIN) 500-125 mg per tablet Not Taking  Yes No   Patient not taking: Reported on 3/20/2023   colchicine (COLCRYS) 0 6 mg tablet   No No   Sig: Take 1 tablet (0 6 mg total) by mouth 3 (three) times a day as needed (gout attacks)   Patient not taking: Reported on 1/31/2023   metoprolol succinate (TOPROL-XL) 50 mg 24 hr tablet   No No   Sig: Take 1 tablet (50 mg total) by mouth daily      Facility-Administered Medications: None       Past Medical History:   Diagnosis Date   • Hypertension    • Overweight     last assessed: 7/28/2017       History reviewed  No pertinent surgical history  Family History   Problem Relation Age of Onset   • No Known Problems Mother    • Hypertension Father    • Prostate cancer Father      I have reviewed and agree with the history as documented  E-Cigarette/Vaping   • E-Cigarette Use Never User      E-Cigarette/Vaping Substances   • Nicotine No    • THC No    • CBD No    • Flavoring No    • Other No    • Unknown No      Social History     Tobacco Use   • Smoking status: Never   • Smokeless tobacco: Never   Vaping Use   • Vaping Use: Never used   Substance Use Topics   • Alcohol use: Not Currently   • Drug use: Not Currently       Review of Systems   Constitutional: Negative for diaphoresis and fever  HENT: Negative for sore throat  Respiratory: Positive for cough, sputum production, chest tightness and shortness of breath  Negative for hemoptysis and wheezing  Cardiovascular: Negative for chest pain and claudication  Gastrointestinal: Negative for abdominal pain, blood in stool and vomiting  Musculoskeletal: Negative for neck pain  Skin: Negative for rash  Neurological: Negative for weakness and numbness  All other systems reviewed and are negative  Physical Exam  Physical Exam  Vitals and nursing note reviewed  Constitutional:       General: He is not in acute distress  Appearance: He is well-developed  He is ill-appearing  HENT:      Head: Normocephalic and atraumatic  Mouth/Throat:      Mouth: Mucous membranes are moist    Eyes:      Conjunctiva/sclera: Conjunctivae normal    Neck:      Vascular: No JVD  Cardiovascular:      Rate and Rhythm: Normal rate and regular rhythm  Heart sounds: No murmur heard  Pulmonary:      Effort: Pulmonary effort is normal  Tachypnea present  Breath sounds: No stridor  Wheezing and rhonchi present     Abdominal:      Palpations: Abdomen is soft  Tenderness: There is no abdominal tenderness  Musculoskeletal:         General: No swelling  Cervical back: Neck supple  Right lower leg: No tenderness  No edema  Left lower leg: No tenderness  No edema  Skin:     General: Skin is warm and dry  Capillary Refill: Capillary refill takes less than 2 seconds  Neurological:      General: No focal deficit present  Mental Status: He is alert and oriented to person, place, and time  Cranial Nerves: No cranial nerve deficit  Motor: No weakness     Psychiatric:         Mood and Affect: Mood normal          Vital Signs  ED Triage Vitals   Temperature Pulse Respirations Blood Pressure SpO2   03/20/23 2356 03/20/23 2326 03/20/23 2326 03/20/23 2329 03/20/23 2326   97 9 °F (36 6 °C) (!) 110 (!) 24 (!) 159/109 94 %      Temp Source Heart Rate Source Patient Position - Orthostatic VS BP Location FiO2 (%)   03/20/23 2326 03/20/23 2326 03/21/23 0015 03/21/23 0015 --   Tympanic Monitor Sitting Right arm       Pain Score       --                  Vitals:    03/21/23 0115 03/21/23 0130 03/21/23 0145 03/21/23 0215   BP: 128/90 135/93 134/89 143/93   Pulse: 81 82 80 84   Patient Position - Orthostatic VS: Sitting Sitting Sitting Sitting         Visual Acuity      ED Medications  Medications   ipratropium-albuterol (DUO-NEB) 0 5-2 5 mg/3 mL inhalation solution 3 mL (3 mL Nebulization Given 3/21/23 0159)   labetalol (NORMODYNE) injection 20 mg (20 mg Intravenous Given 3/20/23 2343)   ipratropium-albuterol (DUO-NEB) 0 5-2 5 mg/3 mL inhalation solution 3 mL (3 mL Nebulization Given 3/20/23 2356)   methylPREDNISolone sodium succinate (Solu-MEDROL) injection 125 mg (125 mg Intravenous Given 3/21/23 0028)       Diagnostic Studies  Results Reviewed     Procedure Component Value Units Date/Time    HS Troponin I 2hr [[id number]]  (Normal) Collected: 03/21/23 0136    Lab Status: Final result Specimen: Blood from Arm, Left Updated: 03/21/23 0210     hs TnI 2hr 8 ng/L      Delta 2hr hsTnI 3 ng/L     FLU/RSV/COVID - if FLU/RSV clinically relevant [[id number]]  (Normal) Collected: 03/20/23 2350    Lab Status: Final result Specimen: Nares from Nose Updated: 03/21/23 0035     SARS-CoV-2 Negative     INFLUENZA A PCR Negative     INFLUENZA B PCR Negative     RSV PCR Negative    Narrative:      FOR PEDIATRIC PATIENTS - copy/paste COVID Guidelines URL to browser: [email or web address]  [employer]    SARS-CoV-2 assay is a Nucleic Acid Amplification assay intended for the  qualitative detection of nucleic acid from SARS-CoV-2 in nasopharyngeal  swabs  Results are for the presumptive identification of SARS-CoV-2 RNA  Positive results are indicative of infection with SARS-CoV-2, the virus  causing COVID-19, but do not rule out bacterial infection or co-infection  with other viruses  Laboratories within the [place] and its  territories are required to report all positive results to the appropriate  public health authorities  Negative results do not preclude SARS-CoV-2  infection and should not be used as the sole basis for treatment or other  patient management decisions  Negative results must be combined with  clinical observations, patient history, and epidemiological information  This test has not been FDA cleared or approved  This test has been authorized by FDA under an Emergency Use Authorization  (EUA)  This test is only authorized for the duration of time the  declaration that circumstances exist justifying the authorization of the  emergency use of an in vitro diagnostic tests for detection of SARS-CoV-2  virus and/or diagnosis of COVID-19 infection under section 564(b)(1) of  the Act, 21 U  S C  684QAU-8(L)(0), unless the authorization is terminated  or revoked sooner  The test has been validated but independent review by FDA  and CLIA is pending  Test performed using [employer] GeneXpert:  This RT-PCR assay targets N2,  a region unique to SARS-CoV-2  A conserved region in the E-gene was chosen  for pan-Sarbecovirus detection which includes SARS-CoV-2  According to CMS-2020-01-R, this platform meets the definition of high-throughput technology      HS Troponin 0hr (reflex protocol) [[id number]]  (Normal) Collected: 03/20/23 2337    Lab Status: Final result Specimen: Blood from Arm, Left Updated: 03/21/23 0010     hs TnI 0hr 5 ng/L     B-Type Natriuretic Peptide(BNP) [[id number]]  (Normal) Collected: 03/20/23 2337    Lab Status: Final result Specimen: Blood from Arm, Left Updated: 03/21/23 0009     BNP 13 pg/mL     Comprehensive metabolic panel [[id number]]  (Abnormal) Collected: 03/20/23 2337    Lab Status: Final result Specimen: Blood from Arm, Left Updated: 03/21/23 0005     Sodium 137 mmol/L      Potassium 3 4 mmol/L      Chloride 102 mmol/L      CO2 25 mmol/L      ANION GAP 10 mmol/L      BUN 18 mg/dL      Creatinine 0 98 mg/dL      Glucose 102 mg/dL      Calcium 9 4 mg/dL      AST 20 U/L      ALT 29 U/L      Alkaline Phosphatase 48 U/L      Total Protein 8 0 g/dL      Albumin 4 5 g/dL      Total Bilirubin 0 42 mg/dL      eGFR 90 ml/min/1 73sq m     Narrative:      [facility] guidelines for Chronic Kidney Disease (CKD):   •  Stage 1 with normal or high GFR (GFR > 90 mL/min/1 73 square meters)  •  Stage 2 Mild CKD (GFR = 60-89 mL/min/1 73 square meters)  •  Stage 3A Moderate CKD (GFR = 45-59 mL/min/1 73 square meters)  •  Stage 3B Moderate CKD (GFR = 30-44 mL/min/1 73 square meters)  •  Stage 4 Severe CKD (GFR = 15-29 mL/min/1 73 square meters)  •  Stage 5 End Stage CKD (GFR <15 mL/min/1 73 square meters)  Note: GFR calculation is accurate only with a steady state creatinine    CBC and differential [[id number]]  (Abnormal) Collected: 03/20/23 2337    Lab Status: Final result Specimen: Blood from Arm, Left Updated: 03/20/23 2344     WBC 12 34 Thousand/uL      RBC 5 22 Million/uL Hemoglobin 16 0 g/dL      Hematocrit 45 8 %      MCV 88 fL      MCH 30 7 pg      MCHC 34 9 g/dL      RDW 12 4 %      MPV 8 7 fL      Platelets 102 Thousands/uL      nRBC 0 /100 WBCs      Neutrophils Relative 36 %      Immat GRANS % 0 %      Lymphocytes Relative 20 %      Monocytes Relative 9 %      Eosinophils Relative 34 %      Basophils Relative 1 %      Neutrophils Absolute 4 38 Thousands/µL      Immature Grans Absolute 0 05 Thousand/uL      Lymphocytes Absolute 2 46 Thousands/µL      Monocytes Absolute 1 08 Thousand/µL      Eosinophils Absolute 4 22 Thousand/µL      Basophils Absolute 0 15 Thousands/µL                  CT chest without contrast   Final Result by Flower Moses MD (03/21 0143)      Scattered areas of airway thickening and debris  May represent infectious bronchitis, alternatively may represent hypersensitivity pneumonitis  Workstation performed: [id number]                    Procedures  Procedures         ED Course  ED Course as of 03/21/23 0249   Tue Mar 21, 2023   0134 Repeat evaluation is much improved over initial   Blood pressure has improved as well as his tachycardia and respiratory distress  He still has some pretty profound wheezing and rhonchi throughout all lung fields  Labs reviewed with patient  Awaiting CT scan results  Will provide another nebulizer treatment while waiting    0215 Patient markedly improved on reevaluation  Lungs are clear to auscultation and there is no signs of respiratory distress  CT scan results reviewed with patient  Patient just completed a course of Augmentin so his etiology for his CT scan findings is unlikely to be due to a bacterial infection                 HEART Risk Score    Flowsheet Row Most Recent Value   Heart Score Risk Calculator    History 0 Filed at: 03/21/2023 0247   ECG 0 Filed at: 03/21/2023 0247   Age 1 Filed at: 03/21/2023 0247   Risk Factors 1 Filed at: 03/21/2023 0247   Troponin 0 Filed at: 03/21/2023 0247   HEART Score 2 Filed at: 03/21/2023 0247                        SBIRT 22yo+    Flowsheet Row Most Recent Value   SBIRT (25 yo +)    In order to provide better care to our patients, we are screening all of our patients for alcohol and drug use  Would it be okay to ask you these screening questions? No Filed at: 03/20/2023 1264                    Medical Decision Making  This is a 49-year-old male who presents to the emergency department with wheezing cough shortness of breath  Differential diagnosis includes pneumonia, pulmonary edema, pleural effusion, ACS/MI, CHF, bronchiolitis, viral URI, pneumonitis(environmental)  Labs and imaging reviewed  Patient's symptoms have improved significantly with administration of steroids and DuoNebs  CT appears to be demonstrating some type of bronchitis versus pneumonitis  EKG shows no sign of ischemia  Patient's troponin and BNP are within normal limits  Based on his medical history is low risk for ACS/MI  Heart score is 2  Although he is significantly tachypneic or presentation he has never been hypoxic  Plan currently is to discharge him home with continued steroids and albuterol treatments as needed  I have also provided him with a number to a pulmonary specialist to follow-up with  I also counseled him on the need to likely wear a respirator at work until the true etiology of his lung condition can be discerned  Patient also provided with strict return precautions  Patient understands and agrees with plan as discussed and all questions answered prior to discharge  Amount and/or Complexity of Data Reviewed  Labs: ordered  Radiology: ordered  Risk  Prescription drug management            Disposition  Final diagnoses:   SOB (shortness of breath)   Pneumonitis     Time reflects when diagnosis was documented in both MDM as applicable and the Disposition within this note     Time User Action Codes Description Comment    3/21/2023  2:16 AM Jonny Servin Add [R06 02] SOB (shortness of breath)     3/21/2023  2:16 AM Maryan Marquez Add [J18 9] Pneumonitis       ED Disposition     ED Disposition   Discharge    Condition   Stable    Date/Time   Tue Mar 21, 2023  2:16 AM    Comment   Goran Babcock discharge to home/self care  Follow-up Information     Follow up With Specialties Details Why Contact Info Additional Marybel Fiore, DO Family Medicine Call today  [address] [facility]  [address]       [facility] [place] [facility] Call today  [address] [phone]  [facility] [facility] [address],  [address]          Discharge Medication List as of 3/21/2023  2:18 AM      START taking these medications    Details   albuterol (ProAir HFA) 90 mcg/act inhaler Inhale 2 puffs every 6 (six) hours as needed for wheezing, Starting Tue 3/21/2023, Normal      methylPREDNISolone 4 MG tablet therapy pack Use as directed on package, Normal         CONTINUE these medications which have NOT CHANGED    Details   amoxicillin-clavulanate (AUGMENTIN) 500-125 mg per tablet Starting Thu 2/2/2023, Historical Med      colchicine (COLCRYS) 0 6 mg tablet Take 1 tablet (0 6 mg total) by mouth 3 (three) times a day as needed (gout attacks), Starting Wed 9/30/2020, Normal      DM-APAP-CPM (Coricidin HBP) -2 MG TABS Historical Med      metoprolol succinate (TOPROL-XL) 50 mg 24 hr tablet Take 1 tablet (50 mg total) by mouth daily, Starting Tue 2/7/2023, Normal      Multiple Vitamin (MULTI VITAMIN DAILY PO) Take by mouth, Historical Med             No discharge procedures on file      PDMP Review       Value Time User    PDMP Reviewed  Yes 1/31/2023  6:22 PM Anna Oliva DO          ED Provider  Electronically Signed by           Dory Holden MD  03/21/23 5715

## 2023-05-16 ENCOUNTER — OFFICE VISIT (OUTPATIENT)
Dept: INTERNAL MEDICINE CLINIC | Facility: CLINIC | Age: 48
End: 2023-05-16

## 2023-05-16 VITALS
OXYGEN SATURATION: 94 % | WEIGHT: 149 LBS | HEIGHT: 68 IN | BODY MASS INDEX: 22.58 KG/M2 | HEART RATE: 103 BPM | DIASTOLIC BLOOD PRESSURE: 94 MMHG | SYSTOLIC BLOOD PRESSURE: 152 MMHG

## 2023-05-16 DIAGNOSIS — J45.41 MODERATE PERSISTENT ASTHMA WITH ACUTE EXACERBATION: Primary | ICD-10-CM

## 2023-05-16 DIAGNOSIS — I10 PRIMARY HYPERTENSION: ICD-10-CM

## 2023-05-16 DIAGNOSIS — J18.9 PNEUMONITIS: ICD-10-CM

## 2023-05-16 DIAGNOSIS — D72.110 IDIOPATHIC HYPEREOSINOPHILIC SYNDROME: ICD-10-CM

## 2023-05-16 DIAGNOSIS — M1A.9XX0 CHRONIC GOUT INVOLVING TOE WITHOUT TOPHUS, UNSPECIFIED CAUSE, UNSPECIFIED LATERALITY: ICD-10-CM

## 2023-05-16 DIAGNOSIS — J67.9 HYPERSENSITIVITY PNEUMONIA (HCC): ICD-10-CM

## 2023-05-16 PROBLEM — D72.10 EOSINOPHILIA: Status: ACTIVE | Noted: 2023-05-16

## 2023-05-16 PROBLEM — Z77.29: Status: ACTIVE | Noted: 2023-05-16

## 2023-05-16 RX ORDER — FLUTICASONE FUROATE AND VILANTEROL 200; 25 UG/1; UG/1
1 POWDER RESPIRATORY (INHALATION) DAILY
Qty: 60 BLISTER | Refills: 0 | Status: SHIPPED | OUTPATIENT
Start: 2023-05-16 | End: 2023-06-15

## 2023-05-16 RX ORDER — PREDNISONE 20 MG/1
TABLET ORAL
Qty: 10 TABLET | Refills: 0 | Status: SHIPPED | OUTPATIENT
Start: 2023-05-16

## 2023-05-16 RX ORDER — LOSARTAN POTASSIUM 100 MG/1
100 TABLET ORAL DAILY
Qty: 90 TABLET | Refills: 0 | Status: SHIPPED | OUTPATIENT
Start: 2023-05-16

## 2023-05-16 RX ORDER — LOSARTAN POTASSIUM AND HYDROCHLOROTHIAZIDE 12.5; 5 MG/1; MG/1
TABLET ORAL
COMMUNITY
Start: 2023-02-15 | End: 2023-05-16

## 2023-05-16 RX ORDER — AMOXICILLIN 500 MG
CAPSULE ORAL
COMMUNITY
Start: 2023-05-01

## 2023-05-16 RX ORDER — ALBUTEROL SULFATE 90 UG/1
2 AEROSOL, METERED RESPIRATORY (INHALATION) EVERY 6 HOURS PRN
Qty: 18 G | Refills: 5 | Status: SHIPPED | OUTPATIENT
Start: 2023-05-16

## 2023-05-16 NOTE — PROGRESS NOTES
Name: Valarie Levin      : 1975      MRN: [id number]  Encounter Provider: Randy Vargas MD  Encounter Date: 2023   Encounter department: [address]     1  Moderate persistent asthma with acute exacerbation  Assessment & Plan:  Patient's primary problem is exacerbation of asthma  Patient probably has a new onset asthma  Recommend  1   Repeat chest x-ray as well as the sinus x-ray  #2 pulmonary function test with bronchodilator  #3 refer to the allergy specialist physicians Dr Brittany Law  4   Prednisone 20 mg 2 tablet daily for 3 days then 1 a day for 4 days  5   Breo inhaler 200/51 puff daily  6   Albuterol 2 puff 4 times a day  7   Consider buying peak flow meter and check it at home  8   Serum IgE, CBC with differential, CMP, and lipid profile in 2 to 3 weeks  Orders:  -     predniSONE 20 mg tablet; Two tablet daily for first 3 days then one tablet daily for next four days with food  -     fluticasone-vilanterol (Breo Ellipta) 200-25 mcg/actuation inhaler; Inhale 1 puff daily Rinse mouth after use  -     albuterol (Ventolin HFA) 90 mcg/act inhaler; Inhale 2 puffs every 6 (six) hours as needed for wheezing  -     Complete PFT with post bronchodilator; Future; Expected date: 2023  -     Ambulatory Referral to Allergy; Future  -     XR sinuses < 3 vw; Future; Expected date: 2023  -     XR chest pa & lateral; Future; Expected date: 2023  -     Comprehensive metabolic panel; Future; Expected date: 2023  -     IgE; Future  -     CBC and differential; Future    2  Hypersensitivity pneumonia (HCC)  -     predniSONE 20 mg tablet; Two tablet daily for first 3 days then one tablet daily for next four days with food  -     fluticasone-vilanterol (Breo Ellipta) 200-25 mcg/actuation inhaler; Inhale 1 puff daily Rinse mouth after use  -     albuterol (Ventolin HFA) 90 mcg/act inhaler;  Inhale 2 puffs every 6 (six) hours as needed for wheezing  -     Complete PFT with post bronchodilator; Future; Expected date: 05/23/2023  -     Ambulatory Referral to Allergy; Future  -     XR sinuses < 3 vw; Future; Expected date: 05/23/2023  -     XR chest pa & lateral; Future; Expected date: 05/23/2023  -     Comprehensive metabolic panel; Future; Expected date: 08/16/2023  -     IgE; Future  -     CBC and differential; Future    3  Idiopathic hypereosinophilic syndrome  -     predniSONE 20 mg tablet; Two tablet daily for first 3 days then one tablet daily for next four days with food  -     fluticasone-vilanterol (Breo Ellipta) 200-25 mcg/actuation inhaler; Inhale 1 puff daily Rinse mouth after use  -     albuterol (Ventolin HFA) 90 mcg/act inhaler; Inhale 2 puffs every 6 (six) hours as needed for wheezing  -     Complete PFT with post bronchodilator; Future; Expected date: 05/23/2023  -     Ambulatory Referral to Allergy; Future  -     XR sinuses < 3 vw; Future; Expected date: 05/23/2023  -     XR chest pa & lateral; Future; Expected date: 05/23/2023  -     Comprehensive metabolic panel; Future; Expected date: 08/16/2023  -     IgE; Future  -     CBC and differential; Future    4  Pneumonitis  -     predniSONE 20 mg tablet; Two tablet daily for first 3 days then one tablet daily for next four days with food  -     fluticasone-vilanterol (Breo Ellipta) 200-25 mcg/actuation inhaler; Inhale 1 puff daily Rinse mouth after use  -     albuterol (Ventolin HFA) 90 mcg/act inhaler; Inhale 2 puffs every 6 (six) hours as needed for wheezing  -     Complete PFT with post bronchodilator; Future; Expected date: 05/23/2023  -     Ambulatory Referral to Allergy; Future  -     XR sinuses < 3 vw; Future; Expected date: 05/23/2023  -     XR chest pa & lateral; Future; Expected date: 05/23/2023  -     Comprehensive metabolic panel; Future; Expected date: 08/16/2023  -     IgE; Future  -     CBC and differential; Future    5   Chronic gout involving toe without tophus, unspecified cause, unspecified laterality  -     Lipid panel; Future; Expected date: 08/16/2023  -     Comprehensive metabolic panel; Future; Expected date: 08/16/2023  -     CBC and differential; Future  -     losartan (COZAAR) 100 MG tablet; Take 1 tablet (100 mg total) by mouth daily  -     diltiazem (CARDIZEM) 30 mg tablet; Take 1 tablet (30 mg total) by mouth 2 (two) times a day    6  Primary hypertension  -     Lipid panel; Future; Expected date: 08/16/2023  -     Comprehensive metabolic panel; Future; Expected date: 08/16/2023  -     CBC and differential; Future  -     losartan (COZAAR) 100 MG tablet; Take 1 tablet (100 mg total) by mouth daily  -     diltiazem (CARDIZEM) 30 mg tablet; Take 1 tablet (30 mg total) by mouth 2 (two) times a day           Subjective     This is a first office visit  Patient used to see Dr Umberto Escobar but apparently wanted to change physician  Patient had emergency room visit on 2/20/2023 as well as 3/2/2023  Emergency room which visits were noted  Patient apparently presented with elevated blood pressure as as well as the cough  Patient did not have any previous history of hypertension  Currently on losartan/hydrochlorothiazide  Home blood pressure is generally good in last 1 month blood pressure systolic is in the range of 1 10-1 30  Diastolic in the range of 19-99  He does not have any family history of high blood pressure  ER work-up were noted  He also has been having cough : Pt c/o cough since last week of January, Pt ahd 2 ER Visit and Urgent care, Pt was thought to have pneumonia, Urgent care, was given Antibiotic, seen by Dr Joann Judd, reportedly did not have pneumonia, was given another course  Of antibiotic  Patient had a subsequent another chest x-ray done per patient's  Patient subsequently ended up in the emergency room due to elevated blood pressure and ongoing cough    Later part of the March till now 2 weeks ago patient was doing better  Patient has been using albuterol off-and-on  Patient had a work-up done as follows    1/27/2023: Chest x-ray no acute cardiopulmonary disease  3/21/2023: CT scan of the chest:LUNGS:  There is wall thickening within the right lower lobe and middle lobe segmental bronchi     Airway debris noted within the left upper lobe segments     No airspace infiltrate     PLEURA:  Unremarkable  Impression:Scattered areas of airway thickening and debris  May represent infectious bronchitis, alternatively may represent hypersensitivity pneumonitis      Cannot find another chest x-ray either at [place] [id number] or [facility]        3/21/2023: ER lab report: Troponin negative, COVID RSV and influenza negative, proBNP normal, second troponin negative, CMP normal except potassium 3 4, CBC normal except WBC count 12 34 neutrophils 36 eosinophils 34  Eosinophil absolute 4 22 basophil 0 15      2/4/2022: CMP normal CBC normal except eosinophil count 24% basophil 2  1/31/2023: Uric acid was ordered apparently which was not done  10/15/2021 cardiovascular report available CMP normal cholesterol 220        triglyceride 183 PSA 0 5 uric acid 9 6  Appears that multiple uric acid has been elevated    Uric acid: 10/5/2021: Uric acid 9 6, 10/2/2020: Uric acid 9 2, 5/18/2019: Uric acid 9 4, 8/5/2017: Uric acid 9 6, 5/25/2016: Uric acid 11 9,    Complaint: Difficulty breathing        HPI: Patient started difficulty breathing 1 to 2 weeks ago  His cough is mild during daytime mostly at nighttime  He is bringing up phlegm phlegm is yellow and white  Any he denies any abdominal pain nausea vomiting diarrhea stuffy nose runny nose earache sore throat  He denies any abdominal pain nausea vomiting diarrhea  Denies any fever or chills  He gets occasional chest wall pain from coughing  On the right lateral side  He had appointment with pulmonologist in end of April    However patient due to a conflict with the work and as he was feeling much better he canceled his appointment  Patient work and exposed to the pain from December 2021 and he quit the job in March 2023  Patient does not have a history of allergy asthma or hayfever  He denies any history of aspirin allergy or sinus infection  He is not did not have any polyp in the nose  Patient went to see another family physicians once or twice  The doctor gave him some blood pressure pill  Couple of times  Patient was given inhaler only once in the emergency room  He has not been using regularly    Patient does not have any symptoms related to hypertensive    Whenever he mows lawn he gets some sore throat and coughing and he is taking some Zyrtec  He does have a history of gout patient had a 2 or 3 total attacks of gout in the life  He is no longer using colchicine  When was the first attack was 5 years ago on the right big toe  Sometimes on the left big toe  But no more than 3 attacks  He was no medicine such as allopurinol or uroseric    Other pertinent noticed to be noticed persistent eosinophilia  Also he has a persistent elevated uric acid  He also has a mild LDL elevations    Patient is  since 2004 1boy 6years old 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  Patient is not exposed to any dust   Patient never worked on the farm before patient denies any exposure to asbestosis or silicosis in the past     Patient was given steroid in the ER which helped him    Review of Systems   Constitutional: Negative for chills and fever  HENT: Negative for congestion, dental problem, drooling, ear discharge, ear pain, facial swelling, hearing loss, mouth sores, nosebleeds, postnasal drip, rhinorrhea, sinus pressure, sinus pain, sneezing, sore throat, tinnitus, trouble swallowing and voice change  Eyes: Negative for pain and visual disturbance  Respiratory: Positive for cough, chest tightness, shortness of breath and wheezing  Negative for apnea, choking and stridor  Cardiovascular: Negative for chest pain and palpitations  Gastrointestinal: Negative for abdominal pain, constipation, diarrhea and vomiting  Genitourinary: Negative for dysuria and hematuria  Musculoskeletal: Negative for arthralgias, back pain and gait problem  Skin: Negative for color change and rash  Neurological: Negative for dizziness, seizures, syncope and headaches  Psychiatric/Behavioral: Negative for agitation, confusion and hallucinations  All other systems reviewed and are negative  Past Medical History:   Diagnosis Date   • Hypertension    • Overweight     last assessed: 7/28/2017     History reviewed  No pertinent surgical history    Family History   Problem Relation Age of Onset   • No Known Problems Mother    • Hypertension Father    • Prostate cancer Father      Social History     Socioeconomic History   • Marital status: /Civil Union     Spouse name: None   • Number of children: None   • Years of education: None   • Highest education level: None   Occupational History   • None   Tobacco Use   • Smoking status: Never   • Smokeless tobacco: Never   Vaping Use   • Vaping Use: Never used   Substance and Sexual Activity   • Alcohol use: Not Currently   • Drug use: Not Currently   • Sexual activity: None   Other Topics Concern   • None   Social History Narrative   • None     Social Determinants of Health     Financial Resource Strain: Not on file   Food Insecurity: Not on file   Transportation Needs: Not on file   Physical Activity: Not on file   Stress: Not on file   Social Connections: Not on file   Intimate Partner Violence: Not on file   Housing Stability: Not on file     Current Outpatient Medications on File Prior to Visit   Medication Sig   • albuterol (ProAir HFA) 90 mcg/act inhaler Inhale 2 puffs every 6 (six) hours as needed for wheezing   • Cetirizine HCl (ZYRTEC ALLERGY PO)    • Magnesium 400 MG CAPS    • Multiple Vitamin "(MULTI VITAMIN DAILY PO) Take by mouth   • Omega-3 Fatty Acids (Fish Oil) 1200 MG CAPS    • Zinc Sulfate (ZINC 15 PO)    • [DISCONTINUED] losartan-hydrochlorothiazide (HYZAAR) 50-12 5 mg per tablet    • [DISCONTINUED] colchicine (COLCRYS) 0 6 mg tablet Take 1 tablet (0 6 mg total) by mouth 3 (three) times a day as needed (gout attacks) (Patient not taking: Reported on 5/16/2023)     Allergies   Allergen Reactions   • Alcohol Gel Base - Food Allergy GI Intolerance   • Ibuprofen Cough     Immunization History   Administered Date(s) Administered   • COVID-19 PFIZER VACCINE 0 3 ML IM 09/21/2021, 10/12/2021   • Tdap 05/15/2019       Objective     /94   Pulse 103   Ht 5' 8\" (1 727 m)   Wt 67 6 kg (149 lb)   SpO2 94%    L/min   BMI 22 66 kg/m²     Physical Exam  Vitals and nursing note reviewed  Constitutional:       Appearance: Normal appearance  He is normal weight  HENT:      Head: Normocephalic  Right Ear: External ear normal  There is impacted cerumen  Left Ear: Tympanic membrane, ear canal and external ear normal  There is no impacted cerumen  Nose: Congestion and rhinorrhea present  Mouth/Throat:      Mouth: Mucous membranes are moist       Pharynx: No oropharyngeal exudate or posterior oropharyngeal erythema  Eyes:      General:         Right eye: No discharge  Conjunctiva/sclera: Conjunctivae normal    Cardiovascular:      Rate and Rhythm: Normal rate and regular rhythm  Pulses: Normal pulses  Heart sounds: Normal heart sounds  No murmur heard  No friction rub  No gallop  Pulmonary:      Breath sounds: No stridor  Wheezing present  No rhonchi  Chest:      Chest wall: No tenderness  Abdominal:      General: Bowel sounds are normal  There is no distension  Palpations: There is no mass  Tenderness: There is no abdominal tenderness  Hernia: No hernia is present  Musculoskeletal:         General: No deformity  Normal range of motion   " Cervical back: Normal range of motion  Right lower leg: No edema  Left lower leg: No edema  Skin:     General: Skin is warm and dry  Coloration: Skin is not jaundiced or pale  Findings: No bruising, erythema, lesion or rash  Neurological:      General: No focal deficit present  Mental Status: He is alert and oriented to person, place, and time  Sensory: No sensory deficit  Motor: No weakness  Gait: Gait normal    Psychiatric:         Mood and Affect: Mood normal          Behavior: Behavior normal          Thought Content:  Thought content normal          Judgment: Judgment normal        Liliana Shea MD

## 2023-05-16 NOTE — ASSESSMENT & PLAN NOTE
Patient's primary problem is exacerbation of asthma  Patient probably has a new onset asthma  Recommend  1   Repeat chest x-ray as well as the sinus x-ray  #2 pulmonary function test with bronchodilator  #3 refer to the allergy specialist physicians Dr Rae Umana  4   Prednisone 20 mg 2 tablet daily for 3 days then 1 a day for 4 days  5   Breo inhaler 200/51 puff daily  6   Albuterol 2 puff 4 times a day  7   Consider buying peak flow meter and check it at home  8   Serum IgE, CBC with differential, CMP, and lipid profile in 2 to 3 weeks

## 2023-05-16 NOTE — PATIENT INSTRUCTIONS
Patient probably has a new onset asthma  Recommend  1   Repeat chest x-ray as well as the sinus x-ray  #2 pulmonary function test with bronchodilator  #3 refer to the allergy specialist physicians Dr Jane Mcclellan  4   Prednisone 20 mg 2 tablet daily for 3 days then 1 a day for 4 days  5   Breo inhaler 200/51 puff daily -Please use gargles after using Breo    6  Albuterol 2 puff 4 times a day  As needed for shortness of breath or wheezing    7  Consider buying peak flow meter and check it at home  8   Serum IgE, CBC with differential, CMP, and lipid profile in 2 to 3 weeks  #9 stop your losartan/hydrochlorothiazide 50/12 5 mg due to your elevated uric acid and history of gout  We will continue losartan but at increased dose 100 mg daily  11   We will start new blood pressure medicine name Cardizem 30 mg twice a day to help your pressure and heart rate  Check your blood pressure once a day possible  Make sure you sit down for 15 minutes before using blood pressure keep the cards with you  Go for this chest x-ray sinus  Blood sent 1 week  Due to your elevated uric acid and history of gout I would like to discontinue losartan/hydrochlorothiazide  I would like to increase losartan alone 100 mg daily  Blood pressure is borderline control  We will also add small dose of Cardizem 30 mg twice a day to help improve blood pressure as well as heart rate  Follow with Consultants as per their and our suggestion    Follow up in approximately one week or as needed earlier    Follow all instructions as advised and discussed  Take your medications as prescribed  Call the office immediately if you experience any side effects  Ask questions if you do not understand  Keep your scheduled appointment as advised or come sooner if necessary or in doubt  Best time to call for non-urgent matter or questions on weekdays is between 9am and 12 noon      See physician for any new symptoms or worsening of current symptoms  Urgent or emergent situations call 911 and report to nearest emergency room  I spent  time taking care of this patient including clinical care, conseling, collaboration, chart, lab and consultant's follow up note,images report, documentation, pre visit  review as appropriate  Patient is to get labs 1 week(s) prior to next visit if advised     Hypertension   AMBULATORY CARE:   Hypertension  is high blood pressure  Your blood pressure is the force of your blood moving against the walls of your arteries  Hypertension causes your blood pressure to get so high that your heart has to work much harder than normal  This can damage your heart  Hypertension that does not respond to medicines and lifestyle changes is called resistant hypertension  Hypertension is considered chronic when it continues for 3 months or longer  Signs and symptoms of hypertension:  You may have no signs or symptoms, or you may have any of the following:  Headache    Blurred vision    Chest pain    Dizziness or weakness    Trouble breathing    Nosebleeds    Call your local emergency number (911 in the [address] [place] [address],[address]) or have someone call if:   You have chest pain  You have any of the following signs of a heart attack:      Squeezing, pressure, or pain in your chest    You may  also have any of the following:     Discomfort or pain in your back, neck, jaw, stomach, or arm    Shortness of breath    Nausea or vomiting    Lightheadedness or a sudden cold sweat    You become confused or have trouble speaking  You suddenly feel lightheaded or have trouble breathing  Seek care immediately if:   You have a severe headache or vision loss  You have weakness in an arm or leg  Call your doctor or cardiologist if:   You feel faint, dizzy, confused, or drowsy  You have been taking your blood pressure medicine but your pressure is higher than your provider says it should be      You have questions or concerns about your condition or care  What you need to know about the stages of hypertension:  Your healthcare provider will give you a blood pressure goal based on your age, health, and risk for cardiovascular disease  The following are general guidelines on the stages of hypertension:  Normal blood pressure is 119/79 or lower   Your healthcare provider may only check your blood pressure each year if it stays at a normal level  Elevated blood pressure is 120/79 to 129/79   This is sometimes called prehypertension  Your healthcare provider may suggest lifestyle changes to help lower your blood pressure to a normal level  He or she may then check it again in 3 to 6 months  Stage 1 hypertension is 130/80  to 139/89   Your provider may recommend lifestyle changes, medication, and checks every 3 to 6 months until your blood pressure is controlled  Stage 2 hypertension is 140/90 or higher   Your provider will recommend lifestyle changes and have you take 2 kinds of hypertension medicines  You will also need to have your blood pressure checked monthly until it is controlled  Treatment  may include any of the following:  Antihypertensives  may be used to help lower your blood pressure  Several kinds of medicines are available  Your healthcare provider will choose medicines based on the kind of hypertension you have  You may need more than one type of medicine  Take the medicine exactly as directed  Diuretics  help decrease extra fluid that collects in your body  This will help lower your blood pressure  You may urinate more often while you take this medicine  Cholesterol medicine  helps lower your cholesterol level  A low cholesterol level helps prevent heart disease and makes it easier to control your blood pressure  Manage hypertension:   Check your blood pressure at home  Do not smoke, have caffeine, or exercise for at least 30 minutes before you check your blood pressure   Sit and rest for 5 minutes before you check your blood pressure  Extend your arm and support it on a flat surface  Your arm should be at the same level as your heart  Follow the directions that came with your blood pressure monitor  Check your blood pressure 2 times, 1 minute apart, before you take your medicine in the morning  Also check your blood pressure before your evening meal  Keep a record of your readings and bring it to your follow-up visits  Ask your healthcare provider what your blood pressure should be  Manage any other health conditions you have  Health conditions such as diabetes can increase your risk for hypertension  Follow your healthcare provider's instructions and take all your medicines as directed  Ask about all medicines  Certain medicines can increase your blood pressure  Examples include oral birth control pills, decongestants, herbal supplements, and NSAIDs, such as ibuprofen  Your healthcare provider can tell you which medicines are safe for you to take  This includes prescription and over-the-counter medicines  Lifestyle changes you can make to manage hypertension:  Your healthcare provider may recommend you work with a team to manage hypertension  The team may include medical experts such as a dietitian, an exercise or physical therapist, and a behavior therapist  Your family members may be included in helping you create lifestyle changes  Limit sodium (salt) as directed  Too much sodium can affect your fluid balance  Check labels to find low-sodium or no-salt-added foods  Some low-sodium foods use potassium salts for flavor  Too much potassium can also cause health problems  Your healthcare provider will tell you how much sodium and potassium are safe for you to have in a day  He or she may recommend that you limit sodium to 2,300 mg a day  Follow the meal plan recommended by your healthcare provider    A dietitian or your provider can give you more information on low-sodium plans or the DASH (Dietary Approaches to Stop Hypertension) eating plan  The DASH plan is low in sodium, processed sugar, unhealthy fats, and total fat  It is high in potassium, calcium, and fiber  These can be found in vegetables, fruit, and whole-grain foods  Be physically active throughout the day  Physical activity, such as exercise, can help control your blood pressure and your weight  Be physically active for at least 30 minutes per day, on most days of the week  Include aerobic activity, such as walking or riding a bicycle  Also include strength training at least 2 times each week  Your healthcare providers can help you create a physical activity plan  Decrease stress  This may help lower your blood pressure  Learn ways to relax, such as deep breathing or listening to music  Limit alcohol as directed  Alcohol can increase your blood pressure  A drink of alcohol is 12 ounces of beer, 5 ounces of wine, or 1½ ounces of liquor  Do not smoke  Nicotine and other chemicals in cigarettes and cigars can increase your blood pressure and also cause lung damage  Ask your healthcare provider for information if you currently smoke and need help to quit  E-cigarettes or smokeless tobacco still contain nicotine  Talk to your healthcare provider before you use these products  Follow up with your doctor or cardiologist as directed: You will need to return to have your blood pressure checked and to have other lab tests done  Write down your questions so you remember to ask them during your visits  © Copyright Evi Trimble 2022 Information is for End User's use only and may not be sold, redistributed or otherwise used for commercial purposes  The above information is an  only  It is not intended as medical advice for individual conditions or treatments   Talk to your doctor, nurse or pharmacist before following any medical regimen to see if it is safe and effective for you     Asthma   WHAT YOU NEED TO KNOW:   Asthma is a lung disease that makes breathing difficult  Chronic inflammation and reactions to triggers narrow the airways in the lungs  Asthma can become life-threatening if it is not managed  DISCHARGE INSTRUCTIONS:   Call your local emergency number (911 in the [address]) if:   You have severe shortness of breath  The skin around your neck and ribs pulls in with each breath  Your peak flow numbers are in the red zone of your AAP  Return to the emergency department if:   You have shortness of breath, even after you take your short-term medicine as directed  Your lips or nails turn blue or gray  Call your doctor or asthma specialist if:   You run out of medicine before your next refill is due  Your symptoms get worse  You need to take more medicine than usual to control your symptoms  You have questions or concerns about your condition or care  Medicines:   Medicines  may be used to decrease inflammation, open airways, and make it easier to breathe  Medicines may be inhaled, taken as a pill, or injected  Short-term medicines relieve your symptoms quickly  Long-term medicines are used to prevent future asthma attacks  Other medicines may be needed if your regular medicines are not able to prevent attacks  You may also need medicine to help control your allergies  Ask your healthcare provider for more information about any medicine you are given and how to take it safely  Take your medicine as directed  Contact your healthcare provider if you think your medicine is not helping or if you have side effects  Tell your provider if you are allergic to any medicine  Keep a list of the medicines, vitamins, and herbs you take  Include the amounts, and when and why you take them  Bring the list or the pill bottles to follow-up visits  Carry your medicine list with you in case of an emergency  Manage your symptoms and prevent future attacks:    Follow your Asthma Action Plan (AAP)  This is a written plan that you and your healthcare provider create  It explains which medicine you need and when to change doses if necessary  It also explains how you can monitor symptoms and use a peak flow meter  The meter measures how well your lungs are working  Manage other health conditions , such as allergies, acid reflux, and sleep apnea  Identify and avoid triggers  These may include pets, dust mites, mold, and cockroaches  Do not smoke or be around others who smoke  Nicotine and other chemicals in cigarettes and cigars can cause lung damage  Ask your healthcare provider for information if you currently smoke and need help to quit  E-cigarettes or smokeless tobacco still contain nicotine  Talk to your healthcare provider before you use these products  Ask about the flu vaccine  The flu can make your asthma worse  You may need a yearly flu shot  Follow up with your healthcare provider as directed: You will need to return to make sure your medicine is working and your symptoms are controlled  You may be referred to an asthma or allergy specialist  Sarika Woo may be asked to keep a record of your peak flow values and bring it with you to your appointments  Write down your questions so you remember to ask them during your visits  © Copyright Karey Leonard 2022 Information is for End User's use only and may not be sold, redistributed or otherwise used for commercial purposes  The above information is an  only  It is not intended as medical advice for individual conditions or treatments  Talk to your doctor, nurse or pharmacist before following any medical regimen to see if it is safe and effective for you

## 2023-05-17 ENCOUNTER — HOSPITAL ENCOUNTER (OUTPATIENT)
Dept: RADIOLOGY | Facility: HOSPITAL | Age: 48
Discharge: HOME | End: 2023-05-17

## 2023-05-17 DIAGNOSIS — J18.9 PNEUMONITIS: ICD-10-CM

## 2023-05-17 DIAGNOSIS — J45.41 MODERATE PERSISTENT ASTHMA WITH ACUTE EXACERBATION: ICD-10-CM

## 2023-05-17 DIAGNOSIS — D72.110 IDIOPATHIC HYPEREOSINOPHILIC SYNDROME: ICD-10-CM

## 2023-05-17 DIAGNOSIS — J67.9 HYPERSENSITIVITY PNEUMONIA (HCC): ICD-10-CM

## 2023-05-24 ENCOUNTER — HOSPITAL ENCOUNTER (OUTPATIENT)
Dept: PULMONOLOGY | Facility: HOSPITAL | Age: 48
Discharge: HOME | End: 2023-05-24

## 2023-05-24 DIAGNOSIS — J45.41 MODERATE PERSISTENT ASTHMA WITH ACUTE EXACERBATION: ICD-10-CM

## 2023-05-24 DIAGNOSIS — D72.110 IDIOPATHIC HYPEREOSINOPHILIC SYNDROME: ICD-10-CM

## 2023-05-24 DIAGNOSIS — J67.9 HYPERSENSITIVITY PNEUMONIA (HCC): ICD-10-CM

## 2023-05-24 DIAGNOSIS — J18.9 PNEUMONITIS: ICD-10-CM

## 2023-05-24 PROBLEM — J98.4 PNEUMONITIS: Status: ACTIVE | Noted: 2023-05-16

## 2023-05-24 RX ORDER — ALBUTEROL SULFATE 2.5 MG/3ML
2.5 SOLUTION RESPIRATORY (INHALATION) ONCE
Status: COMPLETED | OUTPATIENT
Start: 2023-05-24 | End: 2023-05-24

## 2023-05-24 RX ADMIN — ALBUTEROL SULFATE 2.5 MG: 2.5 SOLUTION RESPIRATORY (INHALATION) at 16:24

## 2023-05-29 LAB
ALBUMIN SERPL-MCNC: 4.6 G/DL (ref 4–5)
ALBUMIN/GLOB SERPL: 1.9 {RATIO} (ref 1.2–2.2)
ALP SERPL-CCNC: 45 IU/L (ref 44–121)
ALT SERPL-CCNC: 24 IU/L (ref 0–44)
AST SERPL-CCNC: 16 IU/L (ref 0–40)
BASOPHILS # BLD AUTO: 0.1 X10E3/UL (ref 0–0.2)
BASOPHILS NFR BLD AUTO: 1 %
BILIRUB SERPL-MCNC: 0.5 MG/DL (ref 0–1.2)
BUN SERPL-MCNC: 23 MG/DL (ref 6–24)
BUN/CREAT SERPL: 21 (ref 9–20)
CALCIUM SERPL-MCNC: 9.9 MG/DL (ref 8.7–10.2)
CHLORIDE SERPL-SCNC: 102 MMOL/L (ref 96–106)
CHOLEST SERPL-MCNC: 186 MG/DL (ref 100–199)
CO2 SERPL-SCNC: 22 MMOL/L (ref 20–29)
CREAT SERPL-MCNC: 1.12 MG/DL (ref 0.76–1.27)
EGFR: 81 ML/MIN/1.73
EOSINOPHIL # BLD AUTO: 0.3 X10E3/UL (ref 0–0.4)
EOSINOPHIL NFR BLD AUTO: 3 %
ERYTHROCYTE [DISTWIDTH] IN BLOOD BY AUTOMATED COUNT: 12.9 % (ref 11.6–15.4)
GLOBULIN SER-MCNC: 2.4 G/DL (ref 1.5–4.5)
GLUCOSE SERPL-MCNC: 84 MG/DL (ref 70–99)
HCT VFR BLD AUTO: 42.1 % (ref 37.5–51)
HDLC SERPL-MCNC: 58 MG/DL
HGB BLD-MCNC: 14.5 G/DL (ref 13–17.7)
IGE SERPL-ACNC: 989 IU/ML (ref 6–495)
IMM GRANULOCYTES # BLD: 0.1 X10E3/UL (ref 0–0.1)
IMM GRANULOCYTES NFR BLD: 1 %
LDLC SERPL CALC-MCNC: 109 MG/DL (ref 0–99)
LYMPHOCYTES # BLD AUTO: 2.5 X10E3/UL (ref 0.7–3.1)
LYMPHOCYTES NFR BLD AUTO: 25 %
MCH RBC QN AUTO: 31.3 PG (ref 26.6–33)
MCHC RBC AUTO-ENTMCNC: 34.4 G/DL (ref 31.5–35.7)
MCV RBC AUTO: 91 FL (ref 79–97)
MONOCYTES # BLD AUTO: 1 X10E3/UL (ref 0.1–0.9)
MONOCYTES NFR BLD AUTO: 10 %
NEUTROPHILS # BLD AUTO: 6.1 X10E3/UL (ref 1.4–7)
NEUTROPHILS NFR BLD AUTO: 60 %
PLATELET # BLD AUTO: 345 X10E3/UL (ref 150–450)
POTASSIUM SERPL-SCNC: 3.7 MMOL/L (ref 3.5–5.2)
PROT SERPL-MCNC: 7 G/DL (ref 6–8.5)
RBC # BLD AUTO: 4.64 X10E6/UL (ref 4.14–5.8)
SL AMB VLDL CHOLESTEROL CALC: 19 MG/DL (ref 5–40)
SODIUM SERPL-SCNC: 139 MMOL/L (ref 134–144)
TRIGL SERPL-MCNC: 104 MG/DL (ref 0–149)
WBC # BLD AUTO: 10 X10E3/UL (ref 3.4–10.8)

## 2023-06-09 RX ORDER — DIPHENHYDRAMINE HCL 25 MG
TABLET ORAL
COMMUNITY
End: 2023-06-12

## 2023-06-12 ENCOUNTER — OFFICE VISIT (OUTPATIENT)
Dept: INTERNAL MEDICINE CLINIC | Facility: CLINIC | Age: 48
End: 2023-06-12
Payer: COMMERCIAL

## 2023-06-12 VITALS
BODY MASS INDEX: 22.91 KG/M2 | WEIGHT: 151.2 LBS | DIASTOLIC BLOOD PRESSURE: 60 MMHG | HEART RATE: 84 BPM | HEIGHT: 68 IN | OXYGEN SATURATION: 98 % | SYSTOLIC BLOOD PRESSURE: 110 MMHG

## 2023-06-12 DIAGNOSIS — J18.9 PNEUMONITIS: ICD-10-CM

## 2023-06-12 DIAGNOSIS — E78.00 HYPERCHOLESTEREMIA: ICD-10-CM

## 2023-06-12 DIAGNOSIS — I10 PRIMARY HYPERTENSION: ICD-10-CM

## 2023-06-12 DIAGNOSIS — J45.41 MODERATE PERSISTENT ASTHMA WITH ACUTE EXACERBATION: Primary | ICD-10-CM

## 2023-06-12 DIAGNOSIS — D72.110 IDIOPATHIC HYPEREOSINOPHILIC SYNDROME: ICD-10-CM

## 2023-06-12 DIAGNOSIS — M1A.9XX0 CHRONIC GOUT INVOLVING TOE WITHOUT TOPHUS, UNSPECIFIED CAUSE, UNSPECIFIED LATERALITY: ICD-10-CM

## 2023-06-12 DIAGNOSIS — J01.00 ACUTE NON-RECURRENT MAXILLARY SINUSITIS: ICD-10-CM

## 2023-06-12 DIAGNOSIS — J67.9 HYPERSENSITIVITY PNEUMONIA (HCC): ICD-10-CM

## 2023-06-12 PROBLEM — Z00.00 HEALTHCARE MAINTENANCE: Status: RESOLVED | Noted: 2019-05-15 | Resolved: 2023-06-12

## 2023-06-12 PROBLEM — Z23 IMMUNIZATION DUE: Status: RESOLVED | Noted: 2019-05-15 | Resolved: 2023-06-12

## 2023-06-12 PROBLEM — Z13.83 SCREENING FOR CARDIOVASCULAR, RESPIRATORY, AND GENITOURINARY DISEASES: Status: RESOLVED | Noted: 2020-09-30 | Resolved: 2023-06-12

## 2023-06-12 PROBLEM — Z13.1 SCREENING FOR DIABETES MELLITUS (DM): Status: RESOLVED | Noted: 2020-09-30 | Resolved: 2023-06-12

## 2023-06-12 PROBLEM — Z12.5 PROSTATE CANCER SCREENING: Status: RESOLVED | Noted: 2020-09-30 | Resolved: 2023-06-12

## 2023-06-12 PROBLEM — Z13.6 SCREENING FOR CARDIOVASCULAR, RESPIRATORY, AND GENITOURINARY DISEASES: Status: RESOLVED | Noted: 2020-09-30 | Resolved: 2023-06-12

## 2023-06-12 PROBLEM — Z13.89 SCREENING FOR CARDIOVASCULAR, RESPIRATORY, AND GENITOURINARY DISEASES: Status: RESOLVED | Noted: 2020-09-30 | Resolved: 2023-06-12

## 2023-06-12 PROCEDURE — 99214 OFFICE O/P EST MOD 30 MIN: CPT | Performed by: INTERNAL MEDICINE

## 2023-06-12 RX ORDER — FLUTICASONE FUROATE AND VILANTEROL 200; 25 UG/1; UG/1
1 POWDER RESPIRATORY (INHALATION) DAILY
Qty: 180 BLISTER | Refills: 0 | Status: SHIPPED | OUTPATIENT
Start: 2023-06-12 | End: 2023-06-12

## 2023-06-12 RX ORDER — FLUTICASONE FUROATE AND VILANTEROL 100; 25 UG/1; UG/1
1 POWDER RESPIRATORY (INHALATION) DAILY
Qty: 180 BLISTER | Refills: 0 | Status: SHIPPED | OUTPATIENT
Start: 2023-06-12 | End: 2023-09-10

## 2023-06-12 RX ORDER — LOSARTAN POTASSIUM 100 MG/1
100 TABLET ORAL DAILY
Qty: 90 TABLET | Refills: 0 | Status: SHIPPED | OUTPATIENT
Start: 2023-06-12

## 2023-06-12 NOTE — PROGRESS NOTES
Name: Deric Rizzo      : 1975      MRN: [id number]  Encounter Provider: Sheron Amaya MD  Encounter Date: 2023   Encounter department: [facility]    Assessment & Plan     1  Moderate persistent asthma with acute exacerbation  Assessment & Plan:  2023: Serum IgE 989, lipid profile LDL total 109, CMP normal, CBC normal,    2023: Chest x-ray normal, sinus x-ray there is air-fluid level in the right maxillary sinus consistent with acute sinusitis  3/21/2023: CT scan of chest: Scattered areas of airway thickening and debris's in the may represent infectious bronchitis alternatively hypersensitivity pneumonitis    2023: Pulmonary function test:  ? Normal spirometry     ? No significant response to the administration to bronchodilator per ATS standards     ? Normal Lung volumes     ? Normal diffusion capacity     ? Flow volume loop is normal     I believe patient has a moderate persistent allergic asthma  We will continue Breo 1 puff daily  He is subjectively feeling a lot better  Recommend albuterol as needed  Orders:  -     Fluticasone Furoate-Vilanterol (Breo Ellipta) 100-25 mcg/actuation inhaler; Inhale 1 puff daily Rinse mouth after use  2  Primary hypertension  Assessment & Plan:  2023: Serum IgE 989, lipid profile  CMP normal, CBC normal,    His blood pressure is much better in the range of 110  Heart rate is 84  Currently on Cardizem 30 mg every 12 hours  And losartan 100 mg daily  Since blood pressure is very good  We will continue on the losartan but discontinue diltiazem  We will monitor the trend he will bring the home diary next time    Orders:  -     losartan (COZAAR) 100 MG tablet; Take 1 tablet (100 mg total) by mouth daily    3  Chronic gout involving toe without tophus, unspecified cause, unspecified laterality  -     losartan (COZAAR) 100 MG tablet; Take 1 tablet (100 mg total) by mouth daily    4  Pneumonitis    5  Hypercholesteremia  Assessment & Plan:  Lab Results   Component Value Date    LDLCALC 109 (H) 05/23/2023     Lab Results   Component Value Date    ALT 24 05/23/2023     Lab Results   Component Value Date    CHOLESTEROL 186 05/23/2023    CHOLESTEROL 220 (H) 10/15/2021    CHOLESTEROL 208 (H) 10/02/2020     Lab Results   Component Value Date    HDL 58 05/23/2023    HDL 49 10/15/2021    HDL 56 10/02/2020     Lab Results   Component Value Date    TRIG 104 05/23/2023    TRIG 183 (H) 10/15/2021    TRIG 149 10/02/2020           6  Acute non-recurrent maxillary sinusitis  Assessment & Plan:  5/17/2023: Sinus x-ray:There is an air-fluid level in the right maxillary sinus, consistent with acute sinusitis  Saline nasal spray twice a day  If he has any nasal congestion stuffy nose runny nose to take Claritin no evidence of bacterial infection will not give any antibiotic at this time      7  Hypersensitivity pneumonia ([address] )    8  Idiopathic hypereosinophilic syndrome           Subjective         Patient is here for follow-up of multiple medical problems  Patient is subjectively feeling much better  Asthma is stable  First of all patient is feeling subjectively lot better  Patient was seen by allergy immunologist and notes were noted  5/23/2023: Serum IgE 989, lipid profile  CMP normal, CBC normal,    5/17/2023: Chest x-ray normal, sinus x-ray there is air-fluid level in the right maxillary sinus consistent with acute sinusitis  3/21/2023: CT scan of chest: Scattered areas of airway thickening and debris's in the may represent infectious bronchitis alternatively hypersensitivity pneumonitis    5/16/2023: Pulmonary function test:  ? Normal spirometry     ? No significant response to the administration to bronchodilator per ATS standards     ? Normal Lung volumes     ? Normal diffusion capacity     ? Flow volume loop is normal       Pretension reasonably well controlled    Will discontinue Cardizem heart rate is reasonable we will continue losartan  LDL fair 109 continue diet  Sinusitis no need to treat probably allergic  Recommend saline nasal spray  Allergic rhinitis continue saline nasal spray and take Claritin as needed      Review of Systems   Constitutional: Negative for chills, fatigue and fever  HENT: Negative for congestion, ear pain, rhinorrhea, sore throat and trouble swallowing  Eyes: Negative for pain and visual disturbance  Respiratory: Negative for cough and shortness of breath  Cardiovascular: Negative for chest pain and palpitations  Gastrointestinal: Negative for abdominal pain, constipation, diarrhea and vomiting  Endocrine: Negative for polydipsia  Genitourinary: Negative for dysuria, hematuria and testicular pain  Musculoskeletal: Negative for arthralgias and back pain  Skin: Negative for color change and rash  Neurological: Negative for dizziness, seizures, syncope and headaches  Psychiatric/Behavioral: Negative for agitation, confusion and hallucinations  All other systems reviewed and are negative  Past Medical History:   Diagnosis Date   • Hypertension      History reviewed  No pertinent surgical history    Family History   Problem Relation Age of Onset   • No Known Problems Mother    • Hypertension Father    • Prostate cancer Father      Social History     Socioeconomic History   • Marital status: /Civil Union     Spouse name: None   • Number of children: None   • Years of education: None   • Highest education level: None   Occupational History   • None   Tobacco Use   • Smoking status: Never   • Smokeless tobacco: Never   Vaping Use   • Vaping Use: Never used   Substance and Sexual Activity   • Alcohol use: Not Currently   • Drug use: Not Currently   • Sexual activity: None   Other Topics Concern   • None   Social History Narrative   • None     Social Determinants of Health     Financial Resource Strain: Not on file   Food "Insecurity: Not on file   Transportation Needs: Not on file   Physical Activity: Not on file   Stress: Not on file   Social Connections: Not on file   Intimate Partner Violence: Not on file   Housing Stability: Not on file     Current Outpatient Medications on File Prior to Visit   Medication Sig   • diltiazem (CARDIZEM) 30 mg tablet Take 1 tablet (30 mg total) by mouth 2 (two) times a day   • Multiple Vitamin (MULTI VITAMIN DAILY PO) Take by mouth   • Omega-3 Fatty Acids (Fish Oil) 1200 MG CAPS    • Zinc Sulfate (ZINC 15 PO)    • [DISCONTINUED] fluticasone-vilanterol (Breo Ellipta) 200-25 mcg/actuation inhaler Inhale 1 puff daily Rinse mouth after use  • [DISCONTINUED] losartan (COZAAR) 100 MG tablet Take 1 tablet (100 mg total) by mouth daily   • albuterol (Ventolin HFA) 90 mcg/act inhaler Inhale 2 puffs every 6 (six) hours as needed for wheezing (Patient not taking: Reported on 6/12/2023)   • [DISCONTINUED] diphenhydrAMINE (Diphen) 25 mg tablet  (Patient not taking: Reported on 6/12/2023)   • [DISCONTINUED] predniSONE 20 mg tablet Two tablet daily for first 3 days then one tablet daily for next four days with food (Patient not taking: Reported on 6/12/2023)     Allergies   Allergen Reactions   • Alcohol Gel Base - Food Allergy GI Intolerance   • Ibuprofen Cough     Immunization History   Administered Date(s) Administered   • COVID-19 PFIZER VACCINE 0 3 ML IM 09/21/2021, 10/12/2021   • Tdap 05/15/2019       Objective     /60   Pulse 84   Ht 5' 8\" (1 727 m)   Wt 68 6 kg (151 lb 3 2 oz)   SpO2 98%   BMI 22 99 kg/m²     Physical Exam  Vitals and nursing note reviewed  Constitutional:       Appearance: Normal appearance  He is normal weight  He is not ill-appearing  HENT:      Head: Normocephalic        Left Ear: Ear canal normal       Mouth/Throat:      Mouth: Mucous membranes are moist    Eyes:      Conjunctiva/sclera: Conjunctivae normal    Cardiovascular:      Rate and Rhythm: Normal rate and " regular rhythm  Pulses: Normal pulses  Heart sounds: Normal heart sounds  Pulmonary:      Effort: Pulmonary effort is normal       Breath sounds: Normal breath sounds  No wheezing or rhonchi  Abdominal:      General: Bowel sounds are normal  There is no distension  Tenderness: There is no abdominal tenderness  Musculoskeletal:         General: Normal range of motion  Cervical back: Normal range of motion  Right lower leg: No edema  Left lower leg: No edema  Skin:     General: Skin is warm and dry  Coloration: Skin is not pale  Findings: No erythema or rash  Neurological:      General: No focal deficit present  Mental Status: He is alert and oriented to person, place, and time  Sensory: No sensory deficit  Gait: Gait normal    Psychiatric:         Mood and Affect: Mood normal          Thought Content:  Thought content normal        Sol Lucio MD

## 2023-06-12 NOTE — ASSESSMENT & PLAN NOTE
5/23/2023: Serum IgE 989, lipid profile  CMP normal, CBC normal,    His blood pressure is much better in the range of 110  Heart rate is 84  Currently on Cardizem 30 mg every 12 hours  And losartan 100 mg daily  Since blood pressure is very good  We will continue on the losartan but discontinue diltiazem      We will monitor the trend he will bring the home diary next time

## 2023-06-12 NOTE — ASSESSMENT & PLAN NOTE
Lab Results   Component Value Date    LDLCALC 109 (H) 05/23/2023     Lab Results   Component Value Date    ALT 24 05/23/2023     Lab Results   Component Value Date    CHOLESTEROL 186 05/23/2023    CHOLESTEROL 220 (H) 10/15/2021    CHOLESTEROL 208 (H) 10/02/2020     Lab Results   Component Value Date    HDL 58 05/23/2023    HDL 49 10/15/2021    HDL 56 10/02/2020     Lab Results   Component Value Date    TRIG 104 05/23/2023    TRIG 183 (H) 10/15/2021    TRIG 149 10/02/2020

## 2023-06-12 NOTE — ASSESSMENT & PLAN NOTE
5/17/2023: Sinus x-ray:There is an air-fluid level in the right maxillary sinus, consistent with acute sinusitis  Saline nasal spray twice a day      If he has any nasal congestion stuffy nose runny nose to take Claritin no evidence of bacterial infection will not give any antibiotic at this time

## 2023-06-12 NOTE — PATIENT INSTRUCTIONS
Continue Breo 1 puff daily -We will decrease the strength of Breo 100/25 once a day instead of 200/25    Use albuterol 2 puff every 4-6 hours as needed for any breakthrough wheezing  Continue losartan 100 mg daily for the blood pressure  Stop diltiazem  Continue blood pressure once a day  Use saline nasal spray once or twice a day  Go for the skin testing  Watch your diet for the cholesterol you doing a good job  Follow with Consultants as per their and our suggestion    Follow up in 12 week(s) or as needed earlier    Follow all instructions as advised and discussed  Take your medications as prescribed  Call the office immediately if you experience any side effects  Ask questions if you do not understand  Keep your scheduled appointment as advised or come sooner if necessary or in doubt  Best time to call for non-urgent matter or questions on weekdays is between 9am and 12 noon  See physician for any new symptoms or worsening of current symptoms  Urgent or emergent situations call 911 and report to nearest emergency room  I spent  time taking care of this patient including clinical care, conseling, collaboration, chart, lab and consultant's follow up note,images report, documentation, pre visit  review as appropriate      Patient is to get labs 1 week(s) prior to next visit if advised

## 2023-06-12 NOTE — ASSESSMENT & PLAN NOTE
5/23/2023: Serum IgE 989, lipid profile LDL total 109, CMP normal, CBC normal,    5/17/2023: Chest x-ray normal, sinus x-ray there is air-fluid level in the right maxillary sinus consistent with acute sinusitis  3/21/2023: CT scan of chest: Scattered areas of airway thickening and debris's in the may represent infectious bronchitis alternatively hypersensitivity pneumonitis    5/16/2023: Pulmonary function test:  • Normal spirometry     • No significant response to the administration to bronchodilator per ATS standards     • Normal Lung volumes     • Normal diffusion capacity     • Flow volume loop is normal     I believe patient has a moderate persistent allergic asthma  We will continue Breo 1 puff daily  He is subjectively feeling a lot better  Recommend albuterol as needed

## 2023-08-11 PROBLEM — J01.00 ACUTE MAXILLARY SINUSITIS: Status: RESOLVED | Noted: 2023-06-12 | Resolved: 2023-08-11

## 2023-09-06 DIAGNOSIS — J45.41 MODERATE PERSISTENT ASTHMA WITH ACUTE EXACERBATION: ICD-10-CM

## 2023-09-06 RX ORDER — FLUTICASONE FUROATE AND VILANTEROL TRIFENATATE 100; 25 UG/1; UG/1
POWDER RESPIRATORY (INHALATION)
Qty: 180 EACH | Refills: 0 | Status: SHIPPED | OUTPATIENT
Start: 2023-09-06

## 2023-09-18 ENCOUNTER — OFFICE VISIT (OUTPATIENT)
Dept: INTERNAL MEDICINE CLINIC | Facility: CLINIC | Age: 48
End: 2023-09-18
Payer: COMMERCIAL

## 2023-09-18 VITALS
WEIGHT: 154 LBS | OXYGEN SATURATION: 100 % | HEIGHT: 68 IN | BODY MASS INDEX: 23.34 KG/M2 | SYSTOLIC BLOOD PRESSURE: 136 MMHG | DIASTOLIC BLOOD PRESSURE: 82 MMHG | HEART RATE: 68 BPM

## 2023-09-18 DIAGNOSIS — D72.110 IDIOPATHIC HYPEREOSINOPHILIC SYNDROME: ICD-10-CM

## 2023-09-18 DIAGNOSIS — E78.00 HYPERCHOLESTEREMIA: ICD-10-CM

## 2023-09-18 DIAGNOSIS — J45.40 MODERATE PERSISTENT ASTHMA WITHOUT COMPLICATION: ICD-10-CM

## 2023-09-18 DIAGNOSIS — Z12.11 SCREEN FOR COLON CANCER: ICD-10-CM

## 2023-09-18 DIAGNOSIS — I10 PRIMARY HYPERTENSION: Primary | ICD-10-CM

## 2023-09-18 PROBLEM — J18.9 PNEUMONITIS: Status: RESOLVED | Noted: 2023-05-16 | Resolved: 2023-09-18

## 2023-09-18 PROBLEM — J98.4 PNEUMONITIS: Status: RESOLVED | Noted: 2023-05-16 | Resolved: 2023-09-18

## 2023-09-18 PROCEDURE — 99214 OFFICE O/P EST MOD 30 MIN: CPT | Performed by: INTERNAL MEDICINE

## 2023-09-18 NOTE — ASSESSMENT & PLAN NOTE
He is doing very well. He denies any cough chest congestion shortness of breath wheezing. He is tolerating Breo without side effect he was seen by pulmonologist on August.  Their notes were reviewed. 5/23/2023: Serum IgE 989, lipid profile  CMP normal, CBC normal,    5/17/2023: Chest x-ray normal, sinus x-ray there is air-fluid level in the right maxillary sinus consistent with acute sinusitis  3/21/2023: CT scan of chest: Scattered areas of airway thickening and debris's in the may represent infectious bronchitis alternatively hypersensitivity pneumonitis    5/16/2023: Pulmonary function test:  Normal spirometry    No significant response to the administration to bronchodilator per ATS standards    Normal Lung volumes   Normal diffusion capacity  Flow volume loop is normal.  8/22/2023: Allergy skin testing was done.     Patient was told that he is allergic to cats ,molds, cochroach

## 2023-09-18 NOTE — PROGRESS NOTES
Name: Darlyn Moritz      : 1975      MRN: [id number]  Encounter Provider: Amrita Rice MD  Encounter Date: 2023   Encounter department: [address]     1. Primary hypertension  Assessment & Plan:  Hypertension is well controlled. He remains on losartan. He is no longer on Cardizem. Blood pressure is well controlled. .  Home blood pressure is in the range of 126/80. Blood pressure is slightly high but much better at home. We will continue same regiment. Patient advised to continue to stay on losartan 100 mg daily. 2. Moderate persistent asthma without complication  Assessment & Plan:  He is doing very well. He denies any cough chest congestion shortness of breath wheezing. He is tolerating Breo without side effect he was seen by pulmonologist on August.  Their notes were reviewed. 2023: Serum IgE 989, lipid profile  CMP normal, CBC normal,    2023: Chest x-ray normal, sinus x-ray there is air-fluid level in the right maxillary sinus consistent with acute sinusitis  3/21/2023: CT scan of chest: Scattered areas of airway thickening and debris's in the may represent infectious bronchitis alternatively hypersensitivity pneumonitis    2023: Pulmonary function test:  Normal spirometry    No significant response to the administration to bronchodilator per ATS standards    Normal Lung volumes   Normal diffusion capacity  Flow volume loop is normal.  2023: Allergy skin testing was done. Patient was told that he is allergic to cats ,molds, cochroach      3. Idiopathic hypereosinophilic syndrome  Assessment & Plan:  Recent CBC noted done on 2023. Eosinophil count is 6%. Will need to monitor. Allergy and asthma fair control      4.  Hypercholesteremia  Assessment & Plan:  Lab Results   Component Value Date    LDLCALC 109 (H) 2023     Lab Results   Component Value Date    ALT 24 2023 Lab Results   Component Value Date    CHOLESTEROL 186 05/23/2023    CHOLESTEROL 220 (H) 10/15/2021    CHOLESTEROL 208 (H) 10/02/2020     Lab Results   Component Value Date    HDL 58 05/23/2023    HDL 49 10/15/2021    HDL 56 10/02/2020     Lab Results   Component Value Date    TRIG 104 05/23/2023    TRIG 183 (H) 10/15/2021    TRIG 149 10/02/2020     Will continue low cholesterol diet      5. Screen for colon cancer  -     Ambulatory referral to Gastroenterology; Future           Subjective       Is here for follow-up of hypertension as well as asthma. He is doing very well. He denies any cough chest congestion shortness of breath wheezing. He is tolerating Breo without side effect he was seen by pulmonologist on August.  Their notes were reviewed. Hypertension is well controlled. He remains on losartan. He is no longer on Cardizem. Blood pressure is well controlled. 5/23/2023: Serum IgE 989, lipid profile  CMP normal, CBC normal,    5/17/2023: Chest x-ray normal, sinus x-ray there is air-fluid level in the right maxillary sinus consistent with acute sinusitis  3/21/2023: CT scan of chest: Scattered areas of airway thickening and debris's in the may represent infectious bronchitis alternatively hypersensitivity pneumonitis    5/16/2023: Pulmonary function test:  Normal spirometry    No significant response to the administration to bronchodilator per ATS standards    Normal Lung volumes   Normal diffusion capacity  Flow volume loop is normal.  8/22/2023: Allergy skin testing was done. Patient was told that he is allergic to cats ,molds, cochroach      LDL fair 109 continue diet  Sinusitis no need to treat probably allergic. Recommend saline nasal spray  Allergic rhinitis continue saline nasal spray and take Claritin as needed  9/9/2023: CBC with differential unremarkable with 11 monocytes, 6% eosinophil. Eosinophil well 3%.   Generally seems to be doing fair WBC normal    Review of Systems Constitutional: Negative for chills, fatigue and fever. HENT: Negative for ear pain, sore throat and trouble swallowing. Eyes: Negative for pain and visual disturbance. Respiratory: Negative for cough and shortness of breath. Cardiovascular: Negative for chest pain and palpitations. Gastrointestinal: Negative for abdominal pain, constipation, diarrhea and vomiting. Genitourinary: Negative for difficulty urinating, dysuria, hematuria and testicular pain. Musculoskeletal: Negative for arthralgias and back pain. Skin: Negative for color change and rash. Neurological: Negative for dizziness, seizures, syncope and headaches. Psychiatric/Behavioral: Negative for confusion, hallucinations and sleep disturbance. The patient is not nervous/anxious. All other systems reviewed and are negative. Past Medical History:   Diagnosis Date   • Hypertension      History reviewed. No pertinent surgical history.   Family History   Problem Relation Age of Onset   • No Known Problems Mother    • Hypertension Father    • Prostate cancer Father      Social History     Socioeconomic History   • Marital status: /Civil Union     Spouse name: None   • Number of children: None   • Years of education: None   • Highest education level: None   Occupational History   • None   Tobacco Use   • Smoking status: Never   • Smokeless tobacco: Never   Vaping Use   • Vaping Use: Never used   Substance and Sexual Activity   • Alcohol use: Not Currently   • Drug use: Not Currently   • Sexual activity: None   Other Topics Concern   • None   Social History Narrative   • None     Social Determinants of Health     Financial Resource Strain: Not on file   Food Insecurity: Not on file   Transportation Needs: Not on file   Physical Activity: Not on file   Stress: Not on file   Social Connections: Not on file   Intimate Partner Violence: Not on file   Housing Stability: Not on file     Current Outpatient Medications on File Prior to Visit   Medication Sig   • albuterol (Ventolin HFA) 90 mcg/act inhaler Inhale 2 puffs every 6 (six) hours as needed for wheezing   • Breo Ellipta 100-25 MCG/ACT inhaler INHALE ONE PUFF BY MOUTH EVERY DAY RINSE MOUTH AFTER USE   • losartan (COZAAR) 100 MG tablet Take 1 tablet (100 mg total) by mouth daily   • Multiple Vitamin (MULTI VITAMIN DAILY PO) Take by mouth   • [DISCONTINUED] diltiazem (CARDIZEM) 30 mg tablet Take 1 tablet (30 mg total) by mouth 2 (two) times a day (Patient not taking: Reported on 9/18/2023)   • [DISCONTINUED] Omega-3 Fatty Acids (Fish Oil) 1200 MG CAPS  (Patient not taking: Reported on 9/18/2023)   • [DISCONTINUED] Zinc Sulfate (ZINC 15 PO)  (Patient not taking: Reported on 9/18/2023)     Allergies   Allergen Reactions   • Alcohol Gel Base - Food Allergy GI Intolerance   • Ibuprofen Cough     Immunization History   Administered Date(s) Administered   • COVID-19 PFIZER VACCINE 0.3 ML IM 09/21/2021, 10/12/2021   • Tdap 05/15/2019       Objective     /82   Pulse 68   Ht 5' 8" (1.727 m)   Wt 69.9 kg (154 lb)   SpO2 100%   BMI 23.42 kg/m²     Physical Exam  Vitals and nursing note reviewed. Constitutional:       Appearance: Normal appearance. He is normal weight. He is not ill-appearing. HENT:      Head: Normocephalic. Left Ear: Ear canal normal.   Eyes:      General:         Right eye: No discharge. Left eye: No discharge. Conjunctiva/sclera: Conjunctivae normal.   Cardiovascular:      Rate and Rhythm: Normal rate and regular rhythm. Pulses: Normal pulses. Heart sounds: Normal heart sounds. Pulmonary:      Effort: Pulmonary effort is normal. No respiratory distress. Breath sounds: Normal breath sounds. No wheezing. Musculoskeletal:         General: Normal range of motion. Cervical back: Normal range of motion. Right lower leg: No edema. Left lower leg: No edema. Skin:     General: Skin is warm and dry.       Coloration: Skin is not jaundiced or pale. Neurological:      General: No focal deficit present. Mental Status: He is alert and oriented to person, place, and time. Motor: No weakness.       Gait: Gait normal.   Psychiatric:         Mood and Affect: Mood normal.         Behavior: Behavior normal.         Judgment: Judgment normal.       Anne Valenzuela MD

## 2023-09-18 NOTE — ASSESSMENT & PLAN NOTE
Recent CBC noted done on 9/9/2023. Eosinophil count is 6%. Will need to monitor.   Allergy and asthma fair control

## 2023-09-18 NOTE — ASSESSMENT & PLAN NOTE
Hypertension is well controlled. He remains on losartan. He is no longer on Cardizem. Blood pressure is well controlled. .  Home blood pressure is in the range of 126/80. Blood pressure is slightly high but much better at home. We will continue same regiment. Patient advised to continue to stay on losartan 100 mg daily.

## 2023-09-18 NOTE — PATIENT INSTRUCTIONS
Follow with Consultants as per their and our suggestion    Follow up in 12 week(s) or as needed earlier    Follow all instructions as advised and discussed. Take your medications as prescribed. Call the office immediately if you experience any side effects. Ask questions if you do not understand. Keep your scheduled appointment as advised or come sooner if necessary or in doubt. Best time to call for non-urgent matter or questions on weekdays is between 9am and 12 noon. See physician for any new symptoms or worsening of current symptoms. Urgent or emergent situations call 911 and report to nearest emergency room. I spent  time taking care of this patient including clinical care, conseling, collaboration, chart, lab and consultant's follow up note,images report, documentation, pre visit  review as appropriate.

## 2023-09-18 NOTE — ASSESSMENT & PLAN NOTE
Cardiomediastinal silhouette normal.     Lungs clear.  No effusion or pneumothorax.     Upper abdomen normal. Bones normal for age.     IMPRESSION:     No acute cardiopulmonary disease

## 2023-09-18 NOTE — ASSESSMENT & PLAN NOTE
Lab Results   Component Value Date    LDLCALC 109 (H) 05/23/2023     Lab Results   Component Value Date    ALT 24 05/23/2023     Lab Results   Component Value Date    CHOLESTEROL 186 05/23/2023    CHOLESTEROL 220 (H) 10/15/2021    CHOLESTEROL 208 (H) 10/02/2020     Lab Results   Component Value Date    HDL 58 05/23/2023    HDL 49 10/15/2021    HDL 56 10/02/2020     Lab Results   Component Value Date    TRIG 104 05/23/2023    TRIG 183 (H) 10/15/2021    TRIG 149 10/02/2020     Will continue low cholesterol diet

## 2023-11-29 DIAGNOSIS — J45.41 MODERATE PERSISTENT ASTHMA WITH ACUTE EXACERBATION: ICD-10-CM

## 2023-11-30 RX ORDER — FLUTICASONE FUROATE AND VILANTEROL TRIFENATATE 100; 25 UG/1; UG/1
POWDER RESPIRATORY (INHALATION)
Qty: 180 EACH | Refills: 1 | Status: SHIPPED | OUTPATIENT
Start: 2023-11-30

## 2023-12-19 ENCOUNTER — OFFICE VISIT (OUTPATIENT)
Dept: INTERNAL MEDICINE CLINIC | Facility: CLINIC | Age: 48
End: 2023-12-19
Payer: COMMERCIAL

## 2023-12-19 VITALS
WEIGHT: 154 LBS | SYSTOLIC BLOOD PRESSURE: 110 MMHG | OXYGEN SATURATION: 100 % | HEIGHT: 68 IN | HEART RATE: 89 BPM | BODY MASS INDEX: 23.34 KG/M2 | DIASTOLIC BLOOD PRESSURE: 66 MMHG

## 2023-12-19 DIAGNOSIS — Z87.39 HISTORY OF GOUT: ICD-10-CM

## 2023-12-19 DIAGNOSIS — J45.40 MODERATE PERSISTENT ASTHMA WITHOUT COMPLICATION: Primary | ICD-10-CM

## 2023-12-19 DIAGNOSIS — E78.00 HYPERCHOLESTEREMIA: ICD-10-CM

## 2023-12-19 DIAGNOSIS — I10 PRIMARY HYPERTENSION: ICD-10-CM

## 2023-12-19 PROCEDURE — 99214 OFFICE O/P EST MOD 30 MIN: CPT | Performed by: INTERNAL MEDICINE

## 2023-12-19 NOTE — PATIENT INSTRUCTIONS
Follow with Consultants as per their and our suggestion    Follow up in 12 week(s) or as needed earlier    Follow all instructions as advised and discussed.    Take your medications as prescribed.    Call the office immediately if you experience any side effects.    Ask questions if you do not understand.    Keep your scheduled appointment as advised or come sooner if necessary or in doubt.    Best time to call for non-urgent matter or questions on weekdays is between 9am and 12 noon.    See physician for any new symptoms or worsening of current symptoms.    Urgent or emergent situations call 911 and report to nearest emergency room.    I spent  time taking care of this patient including clinical care, conseling, collaboration, chart, lab and consultant's follow up note,images report, documentation, pre visit  review as appropriate.    Patient is to get labs 1 week(s) prior to next visit if advised

## 2023-12-19 NOTE — PROGRESS NOTES
Name: Francis Espana      : 1975      MRN: [id number]  Encounter Provider: Doug Miller MD  Encounter Date: 2023   Encounter department: [facility]    Assessment & Plan     1. Moderate persistent asthma without complication  Assessment & Plan:  Asthma is stable.    Relevant medications Breo 1 puff daily.  Does not require albuterol most of the time.    2023: Serum IgE 989, lipid profile  CMP normal, CBC normal,    2023: Chest x-ray normal, sinus x-ray there is air-fluid level in the right maxillary sinus consistent with acute sinusitis  3/21/2023: CT scan of chest: Scattered areas of airway thickening and debris's in the may represent infectious bronchitis alternatively hypersensitivity pneumonitis    2023: Pulmonary function test:  Normal spirometry    No significant response to the administration to bronchodilator per ATS standards    Normal Lung volumes   Normal diffusion capacity  Flow volume loop is normal.  2023: Allergy skin testing was done.    Patient was told that he is allergic to cats ,molds, cochroach    Orders:  -     Comprehensive metabolic panel; Future; Expected date: 2024  -     CBC and differential; Future  -     Lipid panel; Future  -     Vitamin D 25 hydroxy; Future  -     Uric acid; Future; Expected date: 2024    2. Primary hypertension  Assessment & Plan:  Hypertension is well controlled.      Relevant medications losartan.  He is no longer on Cardizem.  B        Orders:  -     Comprehensive metabolic panel; Future; Expected date: 2024  -     CBC and differential; Future  -     Lipid panel; Future  -     Vitamin D 25 hydroxy; Future  -     Uric acid; Future; Expected date: 2024    3. Hypercholesteremia  Assessment & Plan:  Lab Results   Component Value Date    LDLCALC 109 (H) 2023     Lab Results   Component Value Date    ALT 24 2023     Lab Results   Component Value Date     CHOLESTEROL 186 05/23/2023    CHOLESTEROL 220 (H) 10/15/2021    CHOLESTEROL 208 (H) 10/02/2020     Lab Results   Component Value Date    HDL 58 05/23/2023    HDL 49 10/15/2021    HDL 56 10/02/2020     Lab Results   Component Value Date    TRIG 104 05/23/2023    TRIG 183 (H) 10/15/2021    TRIG 149 10/02/2020     Will continue low cholesterol diet    Orders:  -     Comprehensive metabolic panel; Future; Expected date: 03/19/2024  -     CBC and differential; Future  -     Lipid panel; Future  -     Vitamin D 25 hydroxy; Future  -     Uric acid; Future; Expected date: 03/19/2024    4. History of gout  -     Uric acid; Future; Expected date: 03/19/2024           Subjective      Patient is doing very well.  Offers no complaint.    Asthma stable.    Blood pressure at home in the range of 1 10-1 20.    Tolerating current medication without side effect.      Review of Systems   Constitutional:  Negative for chills and fever.   HENT:  Negative for ear pain and sore throat.    Eyes:  Negative for pain and visual disturbance.   Respiratory:  Negative for cough and shortness of breath.    Cardiovascular:  Negative for chest pain and palpitations.   Gastrointestinal:  Negative for abdominal pain and vomiting.   Genitourinary:  Negative for dysuria and hematuria.   Musculoskeletal:  Negative for arthralgias and back pain.   Skin:  Negative for color change and rash.   Neurological:  Negative for seizures and syncope.   Psychiatric/Behavioral:  Negative for agitation, behavioral problems, confusion, decreased concentration and dysphoric mood.    All other systems reviewed and are negative.      Current Outpatient Medications on File Prior to Visit   Medication Sig   • albuterol (Ventolin HFA) 90 mcg/act inhaler Inhale 2 puffs every 6 (six) hours as needed for wheezing   • Fluticasone Furoate-Vilanterol (Breo Ellipta) 100-25 mcg/actuation inhaler INHALE 1 PUFF BY MOUTH EVERY DAY, RINSE MOUTH AFTER USE   • losartan (COZAAR) 100 MG  "tablet Take 1 tablet (100 mg total) by mouth daily   • Multiple Vitamin (MULTI VITAMIN DAILY PO) Take by mouth       Objective     /66 (BP Location: Left arm, Patient Position: Sitting, Cuff Size: Standard)   Pulse 89   Ht 5' 8\" (1.727 m)   Wt 69.9 kg (154 lb)   SpO2 100%   BMI 23.42 kg/m²     Physical Exam  Constitutional:       General: He is not in acute distress.     Appearance: He is well-developed. He is not ill-appearing or diaphoretic.   HENT:      Head: Normocephalic and atraumatic.      Right Ear: External ear normal.      Left Ear: External ear normal.   Eyes:      General: Lids are normal. No scleral icterus.        Right eye: No discharge.         Left eye: No discharge.      Conjunctiva/sclera: Conjunctivae normal.   Neck:      Thyroid: No thyroid mass or thyromegaly.      Vascular: No carotid bruit or JVD.      Trachea: Trachea normal.   Cardiovascular:      Rate and Rhythm: Regular rhythm.      Heart sounds: Normal heart sounds.   Pulmonary:      Effort: No respiratory distress.      Breath sounds: No wheezing, rhonchi or rales.   Musculoskeletal:      Right lower leg: No edema.      Left lower leg: No edema.   Lymphadenopathy:      Cervical: No cervical adenopathy.   Skin:     General: Skin is warm.      Coloration: Skin is not jaundiced or pale.      Findings: No rash.   Neurological:      General: No focal deficit present.      Mental Status: He is alert and oriented to person, place, and time.   Psychiatric:         Mood and Affect: Mood normal.         Behavior: Behavior normal.         Thought Content: Thought content normal.         Judgment: Judgment normal.       Doug Miller MD    "

## 2023-12-19 NOTE — ASSESSMENT & PLAN NOTE
Asthma is stable.    Relevant medications Breo 1 puff daily.  Does not require albuterol most of the time.    5/23/2023: Serum IgE 989, lipid profile  CMP normal, CBC normal,    5/17/2023: Chest x-ray normal, sinus x-ray there is air-fluid level in the right maxillary sinus consistent with acute sinusitis  3/21/2023: CT scan of chest: Scattered areas of airway thickening and debris's in the may represent infectious bronchitis alternatively hypersensitivity pneumonitis    5/16/2023: Pulmonary function test:  Normal spirometry    No significant response to the administration to bronchodilator per ATS standards    Normal Lung volumes   Normal diffusion capacity  Flow volume loop is normal.  8/22/2023: Allergy skin testing was done.    Patient was told that he is allergic to cats ,molds, cochroach

## 2023-12-19 NOTE — ASSESSMENT & PLAN NOTE
Hypertension is well controlled.      Relevant medications losartan.  He is no longer on Cardizem.  B

## 2024-01-17 DIAGNOSIS — M1A.9XX0 CHRONIC GOUT INVOLVING TOE WITHOUT TOPHUS, UNSPECIFIED CAUSE, UNSPECIFIED LATERALITY: ICD-10-CM

## 2024-01-17 DIAGNOSIS — I10 PRIMARY HYPERTENSION: ICD-10-CM

## 2024-01-17 RX ORDER — LOSARTAN POTASSIUM 100 MG/1
100 TABLET ORAL DAILY
Qty: 90 TABLET | Refills: 0 | Status: SHIPPED | OUTPATIENT
Start: 2024-01-17

## 2024-03-30 LAB
25(OH)D3+25(OH)D2 SERPL-MCNC: 39.2 NG/ML (ref 30–100)
ALBUMIN SERPL-MCNC: 4.6 G/DL (ref 4.1–5.1)
ALBUMIN/GLOB SERPL: 1.7 {RATIO} (ref 1.2–2.2)
ALP SERPL-CCNC: 43 IU/L (ref 44–121)
ALT SERPL-CCNC: 20 IU/L (ref 0–44)
AST SERPL-CCNC: 22 IU/L (ref 0–40)
BASOPHILS # BLD AUTO: 0.1 X10E3/UL (ref 0–0.2)
BASOPHILS NFR BLD AUTO: 2 %
BILIRUB SERPL-MCNC: 0.4 MG/DL (ref 0–1.2)
BUN SERPL-MCNC: 25 MG/DL (ref 6–24)
BUN/CREAT SERPL: 23 (ref 9–20)
CALCIUM SERPL-MCNC: 9.9 MG/DL (ref 8.7–10.2)
CHLORIDE SERPL-SCNC: 104 MMOL/L (ref 96–106)
CHOLEST SERPL-MCNC: 238 MG/DL (ref 100–199)
CO2 SERPL-SCNC: 23 MMOL/L (ref 20–29)
CREAT SERPL-MCNC: 1.11 MG/DL (ref 0.76–1.27)
EGFR: 81 ML/MIN/1.73
EOSINOPHIL # BLD AUTO: 0.7 X10E3/UL (ref 0–0.4)
EOSINOPHIL NFR BLD AUTO: 12 %
ERYTHROCYTE [DISTWIDTH] IN BLOOD BY AUTOMATED COUNT: 12.9 % (ref 11.6–15.4)
GLOBULIN SER-MCNC: 2.7 G/DL (ref 1.5–4.5)
GLUCOSE SERPL-MCNC: 88 MG/DL (ref 70–99)
HCT VFR BLD AUTO: 44.1 % (ref 37.5–51)
HDLC SERPL-MCNC: 64 MG/DL
HGB BLD-MCNC: 14.5 G/DL (ref 13–17.7)
IMM GRANULOCYTES # BLD: 0 X10E3/UL (ref 0–0.1)
IMM GRANULOCYTES NFR BLD: 0 %
LDLC SERPL CALC-MCNC: 158 MG/DL (ref 0–99)
LYMPHOCYTES # BLD AUTO: 2.2 X10E3/UL (ref 0.7–3.1)
LYMPHOCYTES NFR BLD AUTO: 38 %
MCH RBC QN AUTO: 30.2 PG (ref 26.6–33)
MCHC RBC AUTO-ENTMCNC: 32.9 G/DL (ref 31.5–35.7)
MCV RBC AUTO: 92 FL (ref 79–97)
MONOCYTES # BLD AUTO: 0.5 X10E3/UL (ref 0.1–0.9)
MONOCYTES NFR BLD AUTO: 8 %
NEUTROPHILS # BLD AUTO: 2.3 X10E3/UL (ref 1.4–7)
NEUTROPHILS NFR BLD AUTO: 40 %
PLATELET # BLD AUTO: 240 X10E3/UL (ref 150–450)
POTASSIUM SERPL-SCNC: 4.2 MMOL/L (ref 3.5–5.2)
PROT SERPL-MCNC: 7.3 G/DL (ref 6–8.5)
RBC # BLD AUTO: 4.8 X10E6/UL (ref 4.14–5.8)
SL AMB VLDL CHOLESTEROL CALC: 16 MG/DL (ref 5–40)
SODIUM SERPL-SCNC: 140 MMOL/L (ref 134–144)
TRIGL SERPL-MCNC: 92 MG/DL (ref 0–149)
URATE SERPL-MCNC: 7.5 MG/DL (ref 3.8–8.4)
WBC # BLD AUTO: 5.8 X10E3/UL (ref 3.4–10.8)

## 2024-04-23 ENCOUNTER — OFFICE VISIT (OUTPATIENT)
Dept: INTERNAL MEDICINE CLINIC | Facility: CLINIC | Age: 49
End: 2024-04-23
Payer: COMMERCIAL

## 2024-04-23 VITALS
HEART RATE: 76 BPM | HEIGHT: 68 IN | SYSTOLIC BLOOD PRESSURE: 114 MMHG | WEIGHT: 153 LBS | RESPIRATION RATE: 14 BRPM | DIASTOLIC BLOOD PRESSURE: 66 MMHG | BODY MASS INDEX: 23.19 KG/M2 | OXYGEN SATURATION: 99 %

## 2024-04-23 DIAGNOSIS — J45.40 MODERATE PERSISTENT ASTHMA WITHOUT COMPLICATION: ICD-10-CM

## 2024-04-23 DIAGNOSIS — E78.00 HYPERCHOLESTEREMIA: ICD-10-CM

## 2024-04-23 DIAGNOSIS — I10 PRIMARY HYPERTENSION: Primary | ICD-10-CM

## 2024-04-23 PROCEDURE — 99213 OFFICE O/P EST LOW 20 MIN: CPT | Performed by: INTERNAL MEDICINE

## 2024-04-23 RX ORDER — LOSARTAN POTASSIUM 50 MG/1
50 TABLET ORAL DAILY
Qty: 90 TABLET | Refills: 1 | Status: SHIPPED | OUTPATIENT
Start: 2024-04-23

## 2024-04-23 NOTE — ASSESSMENT & PLAN NOTE
Lab Results   Component Value Date    LDLCALC 158 (H) 03/29/2024   Above reviewed recommended statin advise low-fat low-cholesterol diet will monitor closely

## 2024-04-23 NOTE — PROGRESS NOTES
Dr. Andrews's Office Visit Note  24     Francis Espana 48 y.o. male MRN: [id number]  : 1975    Assessment:     1. Primary hypertension  Assessment & Plan:  Blood pressure very well-controlled actually in the lower rangeLosartan losartan we will cut down losartan to 50 mg daily with low-salt diet no side effects    Orders:  -     losartan (COZAAR) 50 mg tablet; Take 1 tablet (50 mg total) by mouth daily    2. Moderate persistent asthma without complication  Assessment & Plan:  No wheezing no difficulty breathing no further intervention needed will monitor closely      3. Hypercholesteremia  Assessment & Plan:  Lab Results   Component Value Date    LDLCALC 158 (H) 2024   Above reviewed recommended statin advise low-fat low-cholesterol diet will monitor closely            Discussion Summary and Plan:  Today's care plan and medications were reviewed with patient in detail and all their questions answered to their satisfaction.    Chief Complaint   Patient presents with   • Follow-up     3-4 month      Subjective:  Came in for follow-up of chronic medical condition listed visit diagnosis blood pressure ranges on the lower side although no side effects losartan cut down to 50 mg also did review all the labs at length LDL high discussed the statin therapy patient reluctant        The following portions of the patient's history were reviewed and updated as appropriate: allergies, current medications, past family history, past medical history, past social history, past surgical history and problem list.    Review of Systems   Constitutional:  Negative for activity change, appetite change, chills, diaphoresis, fatigue, fever and unexpected weight change.   HENT:  Negative for congestion, dental problem, drooling, ear discharge, ear pain, facial swelling, hearing loss, mouth sores, nosebleeds, postnasal drip, rhinorrhea, sinus pressure, sneezing, sore throat, tinnitus, trouble swallowing and voice change.     Eyes:  Negative for photophobia, pain, discharge, redness, itching and visual disturbance.   Respiratory:  Negative for apnea, cough, choking, chest tightness, shortness of breath, wheezing and stridor.    Cardiovascular:  Negative for chest pain, palpitations and leg swelling.   Gastrointestinal:  Negative for abdominal distention, abdominal pain, anal bleeding, blood in stool, constipation, diarrhea, nausea, rectal pain and vomiting.   Endocrine: Negative for cold intolerance, heat intolerance, polydipsia, polyphagia and polyuria.   Genitourinary:  Negative for decreased urine volume, difficulty urinating, dysuria, enuresis, flank pain, frequency, genital sores, hematuria and urgency.   Musculoskeletal:  Negative for arthralgias, back pain, gait problem, joint swelling, myalgias, neck pain and neck stiffness.   Skin:  Negative for color change, pallor, rash and wound.   Allergic/Immunologic: Negative.  Negative for environmental allergies, food allergies and immunocompromised state.   Neurological:  Negative for dizziness, tremors, seizures, syncope, facial asymmetry, speech difficulty, weakness, light-headedness, numbness and headaches.   Psychiatric/Behavioral:  Negative for agitation, behavioral problems, confusion, decreased concentration, dysphoric mood, hallucinations, self-injury, sleep disturbance and suicidal ideas. The patient is not nervous/anxious and is not hyperactive.          Historical Information   Patient Active Problem List   Diagnosis   • Primary hypertension   • Moderate persistent asthma without complication   • Eosinophilia   • Hypercholesteremia     Past Medical History:   Diagnosis Date   • Hypertension      History reviewed. No pertinent surgical history.  Social History     Substance and Sexual Activity   Alcohol Use Not Currently     Social History     Substance and Sexual Activity   Drug Use Not Currently     Social History     Tobacco Use   Smoking Status Never   Smokeless Tobacco  "Never     Family History   Problem Relation Age of Onset   • No Known Problems Mother    • Hypertension Father    • Prostate cancer Father      Health Maintenance Due   Topic   • Hepatitis C Screening    • Pneumococcal Vaccine: Pediatrics (0 to 5 Years) and At-Risk Patients (6 to 64 Years) (1 of 2 - PCV)   • HIV Screening    • Colorectal Cancer Screening    • Annual Physical    • Influenza Vaccine (1)   • COVID-19 Vaccine (3 - 2023-24 season)   • Zoster Vaccine (1 of 2)      Meds/Allergies       Current Outpatient Medications:   •  losartan (COZAAR) 50 mg tablet, Take 1 tablet (50 mg total) by mouth daily, Disp: 90 tablet, Rfl: 1  •  Multiple Vitamin (MULTI VITAMIN DAILY PO), Take by mouth, Disp: , Rfl:       Objective:    Vitals:   /66   Pulse 76   Resp 14   Ht 5' 8\" (1.727 m)   Wt 69.4 kg (153 lb)   SpO2 99%   BMI 23.26 kg/m²   Body mass index is 23.26 kg/m².  Vitals:    04/23/24 1616   Weight: 69.4 kg (153 lb)       Physical Exam  Vitals and nursing note reviewed.   Constitutional:       General: He is not in acute distress.     Appearance: He is well-developed. He is not ill-appearing, toxic-appearing or diaphoretic.   HENT:      Head: Normocephalic and atraumatic.      Right Ear: External ear normal.      Left Ear: External ear normal.      Nose: Nose normal.      Mouth/Throat:      Pharynx: No oropharyngeal exudate.   Eyes:      General: Lids are normal. Lids are everted, no foreign bodies appreciated. No scleral icterus.        Right eye: No discharge.         Left eye: No discharge.      Conjunctiva/sclera: Conjunctivae normal.      Pupils: Pupils are equal, round, and reactive to light.   Neck:      Thyroid: No thyromegaly.      Vascular: Normal carotid pulses. No carotid bruit, hepatojugular reflux or JVD.      Trachea: No tracheal tenderness or tracheal deviation.   Cardiovascular:      Rate and Rhythm: Normal rate and regular rhythm.      Pulses: Normal pulses.      Heart sounds: Normal heart " sounds. No murmur heard.     No friction rub. No gallop.   Pulmonary:      Effort: Pulmonary effort is normal. No respiratory distress.      Breath sounds: Normal breath sounds. No stridor. No wheezing or rales.   Chest:      Chest wall: No tenderness.   Abdominal:      General: Bowel sounds are normal. There is no distension.      Palpations: Abdomen is soft. There is no mass.      Tenderness: There is no abdominal tenderness. There is no guarding or rebound.   Musculoskeletal:         General: No tenderness or deformity. Normal range of motion.      Cervical back: Normal range of motion and neck supple. No edema, erythema or rigidity. No spinous process tenderness or muscular tenderness. Normal range of motion.   Lymphadenopathy:      Head:      Right side of head: No submental, submandibular, tonsillar, preauricular or posterior auricular adenopathy.      Left side of head: No submental, submandibular, tonsillar, preauricular, posterior auricular or occipital adenopathy.      Cervical: No cervical adenopathy.      Right cervical: No superficial, deep or posterior cervical adenopathy.     Left cervical: No superficial, deep or posterior cervical adenopathy.      Upper Body:      Right upper body: No pectoral adenopathy.      Left upper body: No pectoral adenopathy.   Skin:     General: Skin is warm and dry.      Coloration: Skin is not pale.      Findings: No erythema or rash.   Neurological:      Mental Status: He is alert and oriented to person, place, and time.      Cranial Nerves: No cranial nerve deficit.      Sensory: No sensory deficit.      Motor: No tremor, abnormal muscle tone or seizure activity.      Coordination: Coordination normal.      Gait: Gait normal.      Deep Tendon Reflexes: Reflexes are normal and symmetric. Reflexes normal.   Psychiatric:         Behavior: Behavior normal.         Thought Content: Thought content normal.         Judgment: Judgment normal.         Lab Review   Orders Only on  03/29/2024   Component Date Value Ref Range Status   • White Blood Cell Count 03/29/2024 5.8  3.4 - 10.8 x10E3/uL Final   • Red Blood Cell Count 03/29/2024 4.80  4.14 - 5.80 x10E6/uL Final   • Hemoglobin 03/29/2024 14.5  13.0 - 17.7 g/dL Final   • HCT 03/29/2024 44.1  37.5 - 51.0 % Final   • MCV 03/29/2024 92  79 - 97 fL Final   • MCH 03/29/2024 30.2  26.6 - 33.0 pg Final   • MCHC 03/29/2024 32.9  31.5 - 35.7 g/dL Final   • RDW 03/29/2024 12.9  11.6 - 15.4 % Final   • Platelet Count 03/29/2024 240  150 - 450 x10E3/uL Final   • Neutrophils 03/29/2024 40  Not Estab. % Final   • Lymphocytes 03/29/2024 38  Not Estab. % Final   • Monocytes 03/29/2024 8  Not Estab. % Final   • Eosinophils 03/29/2024 12  Not Estab. % Final   • Basophils PCT 03/29/2024 2  Not Estab. % Final   • Neutrophils (Absolute) 03/29/2024 2.3  1.4 - 7.0 x10E3/uL Final   • Lymphocytes (Absolute) 03/29/2024 2.2  0.7 - 3.1 x10E3/uL Final   • Monocytes (Absolute) 03/29/2024 0.5  0.1 - 0.9 x10E3/uL Final   • Eosinophils (Absolute) 03/29/2024 0.7 (H)  0.0 - 0.4 x10E3/uL Final   • Basophils ABS 03/29/2024 0.1  0.0 - 0.2 x10E3/uL Final   • Immature Granulocytes 03/29/2024 0  Not Estab. % Final   • Immature Granulocytes (Absolute) 03/29/2024 0.0  0.0 - 0.1 x10E3/uL Final    Comment: A hand-written panel/profile was received from your office. In  accordance with the [facility] Ambiguous Test Code Policy dated July 2003, we have assigned CBC with Differential/Platelet, Test Code  #[id number] to this request. If this is not the testing you wished to  receive on this specimen, please contact the [facility] Client Inquiry/  Technical Services Department to clarify the test order. We  appreciate your business.     • Glucose, Random 03/29/2024 88  70 - 99 mg/dL Final   • BUN 03/29/2024 25 (H)  6 - 24 mg/dL Final   • Creatinine 03/29/2024 1.11  0.76 - 1.27 mg/dL Final   • eGFR 03/29/2024 81  >59 mL/min/1.73 Final   • SL AMB BUN/CREATININE RATIO 03/29/2024 23 (H)  9 - 20 Final    • Sodium 03/29/2024 140  134 - 144 mmol/L Final   • Potassium 03/29/2024 4.2  3.5 - 5.2 mmol/L Final   • Chloride 03/29/2024 104  96 - 106 mmol/L Final   • CO2 03/29/2024 23  20 - 29 mmol/L Final   • CALCIUM 03/29/2024 9.9  8.7 - 10.2 mg/dL Final   • Protein, Total 03/29/2024 7.3  6.0 - 8.5 g/dL Final   • Albumin 03/29/2024 4.6  4.1 - 5.1 g/dL Final   • Globulin, Total 03/29/2024 2.7  1.5 - 4.5 g/dL Final   • Albumin/Globulin Ratio 03/29/2024 1.7  1.2 - 2.2 Final   • TOTAL BILIRUBIN 03/29/2024 0.4  0.0 - 1.2 mg/dL Final   • Alk Phos Isoenzymes 03/29/2024 43 (L)  44 - 121 IU/L Final   • AST 03/29/2024 22  0 - 40 IU/L Final   • ALT 03/29/2024 20  0 - 44 IU/L Final   • Cholesterol, Total 03/29/2024 238 (H)  100 - 199 mg/dL Final   • Triglycerides 03/29/2024 92  0 - 149 mg/dL Final   • HDL 03/29/2024 64  >39 mg/dL Final   • VLDL Cholesterol Calculated 03/29/2024 16  5 - 40 mg/dL Final   • LDL Calculated 03/29/2024 158 (H)  0 - 99 mg/dL Final   • 25-HYDROXY VIT D 03/29/2024 39.2  30.0 - 100.0 ng/mL Final    Comment: Vitamin D deficiency has been defined by the [place] of  Medicine and an Endocrine Society practice guideline as a  level of serum 25-OH vitamin D less than 20 ng/mL (1,2).  The Endocrine Society went on to further define vitamin D  insufficiency as a level between 21 and 29 ng/mL (2).  1. IOM ([place] of Medicine). 2010. Dietary reference     intakes for calcium and D. [place]: The     National Academies Press.  2. Prem MF, Nydia NC, Hernandez KIM, et al.     Evaluation, treatment, and prevention of vitamin D     deficiency: an Endocrine Society clinical practice     guideline. JCEM. 2011 Jul; 96(7):1911-30.     • Uric Acid 03/29/2024 7.5  3.8 - 8.4 mg/dL Final               Therapeutic target for gout patients: <6.0         Patient Instructions   Pt is symptom free for this problem.  This diagnosis or problem is stable/well controlled  Patient is advised to continue same meds as  "outlined in medicine list          Elaine Andrews MD        \"This note has been constructed using a voice recognition system.Therefore there may be syntax, spelling, and/or grammatical errors. Please call if you have any questions. \"  "

## 2024-04-23 NOTE — ASSESSMENT & PLAN NOTE
Blood pressure very well-controlled actually in the lower rangeLosartan losartan we will cut down losartan to 50 mg daily with low-salt diet no side effects

## 2024-07-10 DIAGNOSIS — I10 PRIMARY HYPERTENSION: ICD-10-CM

## 2024-07-11 RX ORDER — LOSARTAN POTASSIUM 50 MG/1
50 TABLET ORAL DAILY
Qty: 90 TABLET | Refills: 1 | Status: SHIPPED | OUTPATIENT
Start: 2024-07-11

## 2024-07-11 NOTE — TELEPHONE ENCOUNTER
Request for additional medication not on current medication list - Please review to see if the refill is appropriate.       Patient comment: Pls I also need to refill my old prescriptions due to allergy symptoms- Breo Ellipta and Albuterol hfa inhalers. Thank you.

## 2024-07-17 ENCOUNTER — OFFICE VISIT (OUTPATIENT)
Dept: INTERNAL MEDICINE CLINIC | Facility: CLINIC | Age: 49
End: 2024-07-17
Payer: COMMERCIAL

## 2024-07-17 VITALS
DIASTOLIC BLOOD PRESSURE: 86 MMHG | HEIGHT: 68 IN | BODY MASS INDEX: 21.98 KG/M2 | SYSTOLIC BLOOD PRESSURE: 128 MMHG | OXYGEN SATURATION: 99 % | HEART RATE: 61 BPM | WEIGHT: 145 LBS

## 2024-07-17 DIAGNOSIS — E78.00 HYPERCHOLESTEREMIA: ICD-10-CM

## 2024-07-17 DIAGNOSIS — D72.110 IDIOPATHIC HYPEREOSINOPHILIC SYNDROME: ICD-10-CM

## 2024-07-17 DIAGNOSIS — I10 PRIMARY HYPERTENSION: ICD-10-CM

## 2024-07-17 DIAGNOSIS — J45.41 MODERATE PERSISTENT ASTHMA WITH ACUTE EXACERBATION: Primary | ICD-10-CM

## 2024-07-17 PROCEDURE — 99214 OFFICE O/P EST MOD 30 MIN: CPT | Performed by: INTERNAL MEDICINE

## 2024-07-17 RX ORDER — ATORVASTATIN CALCIUM 20 MG/1
20 TABLET, FILM COATED ORAL DAILY
Qty: 90 TABLET | Refills: 1 | Status: SHIPPED | OUTPATIENT
Start: 2024-07-17

## 2024-07-17 RX ORDER — PREDNISONE 20 MG/1
TABLET ORAL
Qty: 21 TABLET | Refills: 12 | Status: SHIPPED | OUTPATIENT
Start: 2024-07-17

## 2024-07-17 RX ORDER — FLUTICASONE FUROATE AND VILANTEROL 100; 25 UG/1; UG/1
1 POWDER RESPIRATORY (INHALATION) DAILY
COMMUNITY

## 2024-07-17 RX ORDER — ALBUTEROL SULFATE 90 UG/1
2 AEROSOL, METERED RESPIRATORY (INHALATION) EVERY 6 HOURS PRN
Qty: 18 G | Refills: 5 | Status: SHIPPED | OUTPATIENT
Start: 2024-07-17

## 2024-07-17 RX ORDER — LORATADINE 10 MG/1
10 TABLET ORAL DAILY
COMMUNITY

## 2024-07-17 RX ORDER — ALBUTEROL SULFATE 90 UG/1
2 AEROSOL, METERED RESPIRATORY (INHALATION) EVERY 6 HOURS PRN
COMMUNITY
End: 2024-07-17 | Stop reason: SDUPTHER

## 2024-07-17 NOTE — ASSESSMENT & PLAN NOTE
Lab Results   Component Value Date    LDLCALC 158 (H) 03/29/2024   Reviewed    Lab Results   Component Value Date    ALT 20 03/29/2024   Above reviewed    Start Lipitor 20 mg daily with low-fat low-cholesterol diet will repeat lipid profile

## 2024-07-17 NOTE — LETTER
July 17, 2024     Patient: Francis Epsana  YOB: 1975  Date of Visit: 7/17/2024      To Whom it May Concern:    Francis Espana is under my professional care. Francis was seen in my office on 7/17/2024. Francis is advised to return to work on July 22, 2024    If you have any questions or concerns, please don't hesitate to call.         Sincerely,          Elaine Andrews MD        CC: No Recipients

## 2024-07-17 NOTE — ASSESSMENT & PLAN NOTE
Now started coughing especially at night and patient is working in the hot humid weather with some wheezing but no fever chills no chest pain    Will treat as follows    Prednisone tapering doses please see order as follows    Breo 1 puff daily    Proventil 2 puff 4 times a day as needed

## 2024-07-17 NOTE — ASSESSMENT & PLAN NOTE
CBC reviewed elevated eosinophil count history of asthma allergy and asthma symptoms now exacerbation of asthma being treated

## 2024-07-17 NOTE — ASSESSMENT & PLAN NOTE
Blood pressure reviewed borderline advised to continue monitoring blood pressure at home    Asymptomatic    Low-salt diet    Will monitor closely previous BMP reviewed normal

## 2024-07-17 NOTE — PROGRESS NOTES
Dr. Andrews's Office Visit Note  24     Francis Espana 48 y.o. male MRN: [id number]  : 1975    Assessment:     1. Moderate persistent asthma with acute exacerbation  Assessment & Plan:  Now started coughing especially at night and patient is working in the hot humid weather with some wheezing but no fever chills no chest pain    Will treat as follows    Prednisone tapering doses please see order as follows    Breo 1 puff daily    Proventil 2 puff 4 times a day as needed  Orders:  -     albuterol (PROVENTIL HFA,VENTOLIN HFA) 90 mcg/act inhaler; Inhale 2 puffs every 6 (six) hours as needed for wheezing  -     predniSONE 20 mg tablet; 1 pill 3 times a day after meal for 3 days and then 1 pill twice a day after meal for 3 days and then 1 pill once a day after 3 days half a pill daily for 6 days  2. Primary hypertension  Assessment & Plan:  Blood pressure reviewed borderline advised to continue monitoring blood pressure at home    Asymptomatic    Low-salt diet    Will monitor closely previous BMP reviewed normal  3. Idiopathic hypereosinophilic syndrome  Assessment & Plan:  CBC reviewed elevated eosinophil count history of asthma allergy and asthma symptoms now exacerbation of asthma being treated  Orders:  -     predniSONE 20 mg tablet; 1 pill 3 times a day after meal for 3 days and then 1 pill twice a day after meal for 3 days and then 1 pill once a day after 3 days half a pill daily for 6 days  4. Hypercholesteremia  Assessment & Plan:  Lab Results   Component Value Date    LDLCALC 158 (H) 2024   Reviewed    Lab Results   Component Value Date    ALT 20 2024   Above reviewed    Start Lipitor 20 mg daily with low-fat low-cholesterol diet will repeat lipid profile  Orders:  -     atorvastatin (LIPITOR) 20 mg tablet; Take 1 tablet (20 mg total) by mouth daily        Discussion Summary and Plan:  Today's care plan and medications were reviewed with patient in detail and all their questions answered  to their satisfaction.    Chief Complaint   Patient presents with   • Asthma     Asthma acting up coughing at night. Right knee sore and painful seems like since he started breo inhaler.      Subjective:  Patient came in follow-up chronic medical condition listed visit diagnosis mainly coughing persistent dry especially at nighttime and wheezing intermittent been using Breo Proventil as needed no fever chills no chest pain all the labs reviewed for detail refer to assessment plan visit diagnosis    Asthma  He complains of cough and wheezing. There is no shortness of breath. Pertinent negatives include no appetite change, chest pain, ear pain, fever, headaches, myalgias, postnasal drip, rhinorrhea, sneezing, sore throat or trouble swallowing. His past medical history is significant for asthma.       The following portions of the patient's history were reviewed and updated as appropriate: allergies, current medications, past family history, past medical history, past social history, past surgical history and problem list.    Review of Systems   Constitutional:  Negative for activity change, appetite change, chills, diaphoresis, fatigue, fever and unexpected weight change.   HENT:  Negative for congestion, dental problem, drooling, ear discharge, ear pain, facial swelling, hearing loss, mouth sores, nosebleeds, postnasal drip, rhinorrhea, sinus pressure, sneezing, sore throat, tinnitus, trouble swallowing and voice change.    Eyes:  Negative for photophobia, pain, discharge, redness, itching and visual disturbance.   Respiratory:  Positive for cough and wheezing. Negative for apnea, choking, chest tightness, shortness of breath and stridor.    Cardiovascular:  Negative for chest pain, palpitations and leg swelling.   Gastrointestinal:  Negative for abdominal distention, abdominal pain, anal bleeding, blood in stool, constipation, diarrhea, nausea, rectal pain and vomiting.   Endocrine: Negative for cold intolerance,  heat intolerance, polydipsia, polyphagia and polyuria.   Genitourinary:  Negative for decreased urine volume, difficulty urinating, dysuria, enuresis, flank pain, frequency, genital sores, hematuria and urgency.   Musculoskeletal:  Positive for arthralgias. Negative for back pain, gait problem, joint swelling, myalgias, neck pain and neck stiffness.   Skin:  Negative for color change, pallor, rash and wound.   Allergic/Immunologic: Negative.  Negative for environmental allergies, food allergies and immunocompromised state.   Neurological:  Negative for dizziness, tremors, seizures, syncope, facial asymmetry, speech difficulty, weakness, light-headedness, numbness and headaches.   Psychiatric/Behavioral:  Negative for agitation, behavioral problems, confusion, decreased concentration, dysphoric mood, hallucinations, self-injury, sleep disturbance and suicidal ideas. The patient is not nervous/anxious and is not hyperactive.          Historical Information   Patient Active Problem List   Diagnosis   • Primary hypertension   • Moderate persistent asthma with acute exacerbation   • Eosinophilia   • Hypercholesteremia     Past Medical History:   Diagnosis Date   • Hypertension      History reviewed. No pertinent surgical history.  Social History     Substance and Sexual Activity   Alcohol Use Not Currently     Social History     Substance and Sexual Activity   Drug Use Not Currently     Social History     Tobacco Use   Smoking Status Never   Smokeless Tobacco Never     Family History   Problem Relation Age of Onset   • No Known Problems Mother    • Hypertension Father    • Prostate cancer Father      Health Maintenance Due   Topic   • Hepatitis C Screening    • Pneumococcal Vaccine: Pediatrics (0 to 5 Years) and At-Risk Patients (6 to 64 Years) (1 of 2 - PCV)   • HIV Screening    • Colorectal Cancer Screening    • Annual Physical    • COVID-19 Vaccine (3 - 2023-24 season)   • Zoster Vaccine (1 of 2)   • Influenza Vaccine  "(1)      Meds/Allergies       Current Outpatient Medications:   •  albuterol (PROVENTIL HFA,VENTOLIN HFA) 90 mcg/act inhaler, Inhale 2 puffs every 6 (six) hours as needed for wheezing, Disp: 18 g, Rfl: 5  •  atorvastatin (LIPITOR) 20 mg tablet, Take 1 tablet (20 mg total) by mouth daily, Disp: 90 tablet, Rfl: 1  •  Fluticasone Furoate-Vilanterol (Breo Ellipta) 100-25 mcg/actuation inhaler, Inhale 1 puff daily Rinse mouth after use., Disp: , Rfl:   •  loratadine (CLARITIN) 10 mg tablet, Take 10 mg by mouth daily, Disp: , Rfl:   •  losartan (COZAAR) 50 mg tablet, Take 1 tablet (50 mg total) by mouth daily, Disp: 90 tablet, Rfl: 1  •  Multiple Vitamin (MULTI VITAMIN DAILY PO), Take by mouth, Disp: , Rfl:   •  predniSONE 20 mg tablet, 1 pill 3 times a day after meal for 3 days and then 1 pill twice a day after meal for 3 days and then 1 pill once a day after 3 days half a pill daily for 6 days, Disp: 21 tablet, Rfl: 12      Objective:    Vitals:   /86   Pulse 61   Ht 5' 8\" (1.727 m)   Wt 65.8 kg (145 lb)   SpO2 99%   BMI 22.05 kg/m²   Body mass index is 22.05 kg/m².  Vitals:    07/17/24 0911   Weight: 65.8 kg (145 lb)       Physical Exam  Vitals and nursing note reviewed.   Constitutional:       General: He is not in acute distress.     Appearance: He is well-developed. He is not ill-appearing, toxic-appearing or diaphoretic.   HENT:      Head: Normocephalic and atraumatic.      Right Ear: External ear normal.      Left Ear: External ear normal.      Nose: Nose normal.      Mouth/Throat:      Pharynx: No oropharyngeal exudate.   Eyes:      General: Lids are normal. Lids are everted, no foreign bodies appreciated. No scleral icterus.        Right eye: No discharge.         Left eye: No discharge.      Conjunctiva/sclera: Conjunctivae normal.      Pupils: Pupils are equal, round, and reactive to light.   Neck:      Thyroid: No thyromegaly.      Vascular: Normal carotid pulses. No carotid bruit, hepatojugular " reflux or JVD.      Trachea: No tracheal tenderness or tracheal deviation.   Cardiovascular:      Rate and Rhythm: Normal rate and regular rhythm.      Pulses: Normal pulses.      Heart sounds: Normal heart sounds. No murmur heard.     No friction rub. No gallop.   Pulmonary:      Effort: Pulmonary effort is normal. No respiratory distress.      Breath sounds: No stridor. Wheezing present. No rales.   Chest:      Chest wall: No tenderness.   Abdominal:      General: Bowel sounds are normal. There is no distension.      Palpations: Abdomen is soft. There is no mass.      Tenderness: There is no abdominal tenderness. There is no guarding or rebound.   Musculoskeletal:         General: No tenderness or deformity. Normal range of motion.      Cervical back: Normal range of motion and neck supple. No edema, erythema or rigidity. No spinous process tenderness or muscular tenderness. Normal range of motion.   Lymphadenopathy:      Head:      Right side of head: No submental, submandibular, tonsillar, preauricular or posterior auricular adenopathy.      Left side of head: No submental, submandibular, tonsillar, preauricular, posterior auricular or occipital adenopathy.      Cervical: No cervical adenopathy.      Right cervical: No superficial, deep or posterior cervical adenopathy.     Left cervical: No superficial, deep or posterior cervical adenopathy.      Upper Body:      Right upper body: No pectoral adenopathy.      Left upper body: No pectoral adenopathy.   Skin:     General: Skin is warm and dry.      Coloration: Skin is not pale.      Findings: No erythema or rash.   Neurological:      General: No focal deficit present.      Mental Status: He is alert and oriented to person, place, and time.      Cranial Nerves: No cranial nerve deficit.      Sensory: No sensory deficit.      Motor: No tremor, abnormal muscle tone or seizure activity.      Coordination: Coordination normal.      Gait: Gait normal.      Deep Tendon  Reflexes: Reflexes are normal and symmetric. Reflexes normal.   Psychiatric:         Behavior: Behavior normal.         Thought Content: Thought content normal.         Judgment: Judgment normal.         Lab Review   No visits with results within 2 Month(s) from this visit.   Latest known visit with results is:   Orders Only on 03/29/2024   Component Date Value Ref Range Status   • White Blood Cell Count 03/29/2024 5.8  3.4 - 10.8 x10E3/uL Final   • Red Blood Cell Count 03/29/2024 4.80  4.14 - 5.80 x10E6/uL Final   • Hemoglobin 03/29/2024 14.5  13.0 - 17.7 g/dL Final   • HCT 03/29/2024 44.1  37.5 - 51.0 % Final   • MCV 03/29/2024 92  79 - 97 fL Final   • MCH 03/29/2024 30.2  26.6 - 33.0 pg Final   • MCHC 03/29/2024 32.9  31.5 - 35.7 g/dL Final   • RDW 03/29/2024 12.9  11.6 - 15.4 % Final   • Platelet Count 03/29/2024 240  150 - 450 x10E3/uL Final   • Neutrophils 03/29/2024 40  Not Estab. % Final   • Lymphocytes 03/29/2024 38  Not Estab. % Final   • Monocytes 03/29/2024 8  Not Estab. % Final   • Eosinophils 03/29/2024 12  Not Estab. % Final   • Basophils PCT 03/29/2024 2  Not Estab. % Final   • Neutrophils (Absolute) 03/29/2024 2.3  1.4 - 7.0 x10E3/uL Final   • Lymphocytes (Absolute) 03/29/2024 2.2  0.7 - 3.1 x10E3/uL Final   • Monocytes (Absolute) 03/29/2024 0.5  0.1 - 0.9 x10E3/uL Final   • Eosinophils (Absolute) 03/29/2024 0.7 (H)  0.0 - 0.4 x10E3/uL Final   • Basophils ABS 03/29/2024 0.1  0.0 - 0.2 x10E3/uL Final   • Immature Granulocytes 03/29/2024 0  Not Estab. % Final   • Immature Granulocytes (Absolute) 03/29/2024 0.0  0.0 - 0.1 x10E3/uL Final    Comment: A hand-written panel/profile was received from your office. In  accordance with the [facility] Ambiguous Test Code Policy dated July 2003, we have assigned CBC with Differential/Platelet, Test Code  #[id number] to this request. If this is not the testing you wished to  receive on this specimen, please contact the [facility] Client Inquiry/  Technical Services  Department to clarify the test order. We  appreciate your business.     • Glucose, Random 03/29/2024 88  70 - 99 mg/dL Final   • BUN 03/29/2024 25 (H)  6 - 24 mg/dL Final   • Creatinine 03/29/2024 1.11  0.76 - 1.27 mg/dL Final   • eGFR 03/29/2024 81  >59 mL/min/1.73 Final   • SL AMB BUN/CREATININE RATIO 03/29/2024 23 (H)  9 - 20 Final   • Sodium 03/29/2024 140  134 - 144 mmol/L Final   • Potassium 03/29/2024 4.2  3.5 - 5.2 mmol/L Final   • Chloride 03/29/2024 104  96 - 106 mmol/L Final   • CO2 03/29/2024 23  20 - 29 mmol/L Final   • CALCIUM 03/29/2024 9.9  8.7 - 10.2 mg/dL Final   • Protein, Total 03/29/2024 7.3  6.0 - 8.5 g/dL Final   • Albumin 03/29/2024 4.6  4.1 - 5.1 g/dL Final   • Globulin, Total 03/29/2024 2.7  1.5 - 4.5 g/dL Final   • Albumin/Globulin Ratio 03/29/2024 1.7  1.2 - 2.2 Final   • TOTAL BILIRUBIN 03/29/2024 0.4  0.0 - 1.2 mg/dL Final   • Alk Phos Isoenzymes 03/29/2024 43 (L)  44 - 121 IU/L Final   • AST 03/29/2024 22  0 - 40 IU/L Final   • ALT 03/29/2024 20  0 - 44 IU/L Final   • Cholesterol, Total 03/29/2024 238 (H)  100 - 199 mg/dL Final   • Triglycerides 03/29/2024 92  0 - 149 mg/dL Final   • HDL 03/29/2024 64  >39 mg/dL Final   • VLDL Cholesterol Calculated 03/29/2024 16  5 - 40 mg/dL Final   • LDL Calculated 03/29/2024 158 (H)  0 - 99 mg/dL Final   • 25-HYDROXY VIT D 03/29/2024 39.2  30.0 - 100.0 ng/mL Final    Comment: Vitamin D deficiency has been defined by the [place]  [employer] and an Endocrine Society practice guideline as a  level of serum 25-OH vitamin D less than 20 ng/mL (1,2).  The Endocrine Society went on to further define vitamin D  insufficiency as a level between 21 and 29 ng/mL (2).  1. IOM ([place] [employer]). 2010. Dietary reference     intakes for calcium and D. [place]: The     National Academies Press.  2. Prem MF, Nydia HERNANDEZ, Hernandez KIM, et al.     Evaluation, treatment, and prevention of vitamin D     deficiency: an Endocrine Society clinical  "practice     guideline. JCEM. 2011 Jul; 96(7):1911-30.     • Uric Acid 03/29/2024 7.5  3.8 - 8.4 mg/dL Final               Therapeutic target for gout patients: <6.0         Patient Instructions   Patient Education     Asthma in adults   The Basics   Written by the doctors and editors at [facility]   What is asthma? -- Asthma is a condition that can make it hard to breathe. Asthma symptoms can be mild or severe. They can come and go. An asthma \"attack\" is when symptoms start suddenly. This happens when the airways in the lungs become more narrow and inflamed (figure 1).  Asthma can run in families.  What are the symptoms of asthma? -- Asthma symptoms can include:   Wheezing or noisy breathing   Coughing   Tight feeling in the chest   Shortness of breath  Symptoms can happen each day, each week, or less often. Symptoms can range from mild to severe. Although it is rare, an asthma attack can sometimes even lead to death.  Is there a test for asthma? -- Yes. Your doctor will ask you about your symptoms and have you do a breathing test to check how your lungs are working.  If your doctor thinks that allergies might be making your asthma worse, they might suggest allergy testing. This can include skin tests or blood tests.  How is asthma treated? -- Asthma is treated with different types of medicines. The medicines can be inhalers, liquids, or pills. Your doctor will prescribe medicine based on how often you have symptoms and how serious your symptoms are. There are 2 main types of asthma medicines:   Quick-relief medicines stop symptoms quickly, in 5 to 15 minutes. Almost everyone with asthma carries a quick-relief inhaler with them. People use these medicines whenever they have asthma symptoms. Most people need these medicines 1 or 2 times a week, or less often. But when asthma symptoms get worse, more doses might be needed.   Long-term controller medicines control asthma and help prevent future attacks. People who get " "asthma symptoms more than 2 times a week should use a controller medicine every day.  Some medicines can work as both a controller medicine and a quick-relief medicine. These are taken once or twice a day as controller medicines. They can also be used for quick relief.  It is very important to take all of the medicines the doctor prescribes, exactly how you are supposed to take them. You might have to take medicines a few times a day. Your doctor, nurse, or pharmacist will show you the right way to use your inhaler(s).  If your symptoms get much worse all of a sudden, use your quick-relief medicine and contact your doctor or nurse. You might need to go to the hospital for treatment.  What is an asthma action plan? -- An asthma action plan is a list of instructions that tell you (form 1):   Which medicines to use each day at home   Which medicines to take if your symptoms get worse   When to get help or call for an ambulance  If you have frequent or severe asthma symptoms, your doctor might suggest that you have an asthma action plan. If so, you and your doctor will work together to make one. As part of your action plan, you might need to use something called a \"peak flow meter.\" Breathing into this device will show how your lungs are working. Your doctor will show you the right way to use your peak flow meter.  Can asthma symptoms be prevented? -- There are things that you can do to help prevent asthma attacks. Your doctor or nurse can talk to you about what is most important for you.  In general, you can:   Avoid \"triggers\" - These are things that make your symptoms worse. Common triggers include smoke, air pollution, dust, mold, pollen, strong chemicals or smells, and very cold or dry air. For some people, being around certain animals can trigger symptoms. Exercise and stress can also be triggers.  Some adults with asthma have worse symptoms if they take aspirin or medicines called NSAIDs. NSAIDs include ibuprofen " "(sample brand names: Advil, Motrin) and naproxen (sample brand names: Aleve, Naprosyn). Ask your doctor if you need to avoid these medicines.  If you can't avoid certain triggers, talk with your doctor about what you can do. For example, you might need to take an extra dose of your quick-relief inhaler medicine before you exercise or are around things you are allergic to.   Lower your risk of getting sick - Some infections can make asthma symptoms worse. These include the common cold, the flu, and coronavirus disease 2019 (\"COVID-19\").  It's important to get the COVID-19 vaccine. This will lower the risk of severe illness if you do get COVID-19. You should also get a flu shot every year. Plus, some people need to get a vaccine to help prevent pneumonia.  If you think that you might have an infection, tell your doctor or nurse. They can help you figure out if you need treatment.   Make sure that you know how and when to take your medicines - If you take controller medicines, follow all instructions to help prevent symptoms. You should also make sure that you know how and when to use your quick-relief medicine.   See your doctor or nurse regularly - If you need asthma medicine every day, you should see your doctor or nurse at least every 6 months. At these appointments, they will ask about your symptoms, check how well your lungs are working, and talk about your treatment plan.  What if I want to get pregnant? -- If you want to get pregnant, talk to your doctor about how to control your asthma. Keeping your asthma well controlled is important for the health of your baby. Most asthma medicines are safe to take if you are pregnant.  When should I call the doctor? -- Call for an ambulance (in the [place] and [place], call 9-1-1) if you have severe symptoms like:   You have so much trouble breathing that you cannot talk.   Your lips or fingernails turn gray or blue.  Call your doctor or nurse if:   You have an asthma attack and " the symptoms do not improve, or get worse, after using a quick-relief medicine.   You need to use your quick-relief medicine more than 2 times a week.   You cannot do your normal activities because of your asthma symptoms.   You have any questions about your medicines.  All topics are updated as new evidence becomes available and our peer review process is complete.  This topic retrieved from [employer] on: Mar 29, 2024.  Topic 34078 Version 24.0  Release: 32.2.4 - C32.87  © 2024 UpToDate, Inc. and/or its affiliates. All rights reserved.  figure 1: Child with asthma     During an asthma attack or flare-up, the muscles around the airways tighten (constrict), and the lining of the airways gets inflamed. Then, mucus builds up. All of this makes it hard to breathe.  Graphic 14353 Version 10.0  form 1: Asthma action plan (adult)     Graphic 162898 Version 1.0  Consumer Information Use and Disclaimer   Disclaimer: This generalized information is a limited summary of diagnosis, treatment, and/or medication information. It is not meant to be comprehensive and should be used as a tool to help the user understand and/or assess potential diagnostic and treatment options. It does NOT include all information about conditions, treatments, medications, side effects, or risks that may apply to a specific patient. It is not intended to be medical advice or a substitute for the medical advice, diagnosis, or treatment of a health care provider based on the health care provider's examination and assessment of a patient's specific and unique circumstances. Patients must speak with a health care provider for complete information about their health, medical questions, and treatment options, including any risks or benefits regarding use of medications. This information does not endorse any treatments or medications as safe, effective, or approved for treating a specific patient. UpToDate, Inc. and its affiliates disclaim any warranty or  "liability relating to this information or the use thereof.The use of this information is governed by the Terms of Use, available at https://www.wolterskluwer.com/en/know/clinical-effectiveness-terms. 2024© UpToDate, Inc. and its affiliates and/or licensors. All rights reserved.  Copyright   © 2024 Up[employer]Date, Inc. and/or its affiliates. All rights reserved.       Elaine Andrews MD        \"This note has been constructed using a voice recognition system.Therefore there may be syntax, spelling, and/or grammatical errors. Please call if you have any questions. \"  "

## 2024-07-17 NOTE — PATIENT INSTRUCTIONS
"Patient Education     Asthma in adults   The Basics   Written by the doctors and editors at [facility]   What is asthma? -- Asthma is a condition that can make it hard to breathe. Asthma symptoms can be mild or severe. They can come and go. An asthma \"attack\" is when symptoms start suddenly. This happens when the airways in the lungs become more narrow and inflamed (figure 1).  Asthma can run in families.  What are the symptoms of asthma? -- Asthma symptoms can include:   Wheezing or noisy breathing   Coughing   Tight feeling in the chest   Shortness of breath  Symptoms can happen each day, each week, or less often. Symptoms can range from mild to severe. Although it is rare, an asthma attack can sometimes even lead to death.  Is there a test for asthma? -- Yes. Your doctor will ask you about your symptoms and have you do a breathing test to check how your lungs are working.  If your doctor thinks that allergies might be making your asthma worse, they might suggest allergy testing. This can include skin tests or blood tests.  How is asthma treated? -- Asthma is treated with different types of medicines. The medicines can be inhalers, liquids, or pills. Your doctor will prescribe medicine based on how often you have symptoms and how serious your symptoms are. There are 2 main types of asthma medicines:   Quick-relief medicines stop symptoms quickly, in 5 to 15 minutes. Almost everyone with asthma carries a quick-relief inhaler with them. People use these medicines whenever they have asthma symptoms. Most people need these medicines 1 or 2 times a week, or less often. But when asthma symptoms get worse, more doses might be needed.   Long-term controller medicines control asthma and help prevent future attacks. People who get asthma symptoms more than 2 times a week should use a controller medicine every day.  Some medicines can work as both a controller medicine and a quick-relief medicine. These are taken once or twice " "a day as controller medicines. They can also be used for quick relief.  It is very important to take all of the medicines the doctor prescribes, exactly how you are supposed to take them. You might have to take medicines a few times a day. Your doctor, nurse, or pharmacist will show you the right way to use your inhaler(s).  If your symptoms get much worse all of a sudden, use your quick-relief medicine and contact your doctor or nurse. You might need to go to the hospital for treatment.  What is an asthma action plan? -- An asthma action plan is a list of instructions that tell you (form 1):   Which medicines to use each day at home   Which medicines to take if your symptoms get worse   When to get help or call for an ambulance  If you have frequent or severe asthma symptoms, your doctor might suggest that you have an asthma action plan. If so, you and your doctor will work together to make one. As part of your action plan, you might need to use something called a \"peak flow meter.\" Breathing into this device will show how your lungs are working. Your doctor will show you the right way to use your peak flow meter.  Can asthma symptoms be prevented? -- There are things that you can do to help prevent asthma attacks. Your doctor or nurse can talk to you about what is most important for you.  In general, you can:   Avoid \"triggers\" - These are things that make your symptoms worse. Common triggers include smoke, air pollution, dust, mold, pollen, strong chemicals or smells, and very cold or dry air. For some people, being around certain animals can trigger symptoms. Exercise and stress can also be triggers.  Some adults with asthma have worse symptoms if they take aspirin or medicines called NSAIDs. NSAIDs include ibuprofen (sample brand names: Advil, Motrin) and naproxen (sample brand names: Aleve, Naprosyn). Ask your doctor if you need to avoid these medicines.  If you can't avoid certain triggers, talk with your " "doctor about what you can do. For example, you might need to take an extra dose of your quick-relief inhaler medicine before you exercise or are around things you are allergic to.   Lower your risk of getting sick - Some infections can make asthma symptoms worse. These include the common cold, the flu, and coronavirus disease 2019 (\"COVID-19\").  It's important to get the COVID-19 vaccine. This will lower the risk of severe illness if you do get COVID-19. You should also get a flu shot every year. Plus, some people need to get a vaccine to help prevent pneumonia.  If you think that you might have an infection, tell your doctor or nurse. They can help you figure out if you need treatment.   Make sure that you know how and when to take your medicines - If you take controller medicines, follow all instructions to help prevent symptoms. You should also make sure that you know how and when to use your quick-relief medicine.   See your doctor or nurse regularly - If you need asthma medicine every day, you should see your doctor or nurse at least every 6 months. At these appointments, they will ask about your symptoms, check how well your lungs are working, and talk about your treatment plan.  What if I want to get pregnant? -- If you want to get pregnant, talk to your doctor about how to control your asthma. Keeping your asthma well controlled is important for the health of your baby. Most asthma medicines are safe to take if you are pregnant.  When should I call the doctor? -- Call for an ambulance (in the [place] and [place], call 9-1-1) if you have severe symptoms like:   You have so much trouble breathing that you cannot talk.   Your lips or fingernails turn gray or blue.  Call your doctor or nurse if:   You have an asthma attack and the symptoms do not improve, or get worse, after using a quick-relief medicine.   You need to use your quick-relief medicine more than 2 times a week.   You cannot do your normal activities " because of your asthma symptoms.   You have any questions about your medicines.  All topics are updated as new evidence becomes available and our peer review process is complete.  This topic retrieved from [employer] on: Mar 29, 2024.  Topic 21897 Version 24.0  Release: 32.2.4 - C32.87  © 2024 [employer] and/or its affiliates. All rights reserved.  figure 1: Child with asthma     During an asthma attack or flare-up, the muscles around the airways tighten (constrict), and the lining of the airways gets inflamed. Then, mucus builds up. All of this makes it hard to breathe.  Graphic 29589 Version 10.0  form 1: Asthma action plan (adult)     Graphic 314234 Version 1.0  Consumer Information Use and Disclaimer   Disclaimer: This generalized information is a limited summary of diagnosis, treatment, and/or medication information. It is not meant to be comprehensive and should be used as a tool to help the user understand and/or assess potential diagnostic and treatment options. It does NOT include all information about conditions, treatments, medications, side effects, or risks that may apply to a specific patient. It is not intended to be medical advice or a substitute for the medical advice, diagnosis, or treatment of a health care provider based on the health care provider's examination and assessment of a patient's specific and unique circumstances. Patients must speak with a health care provider for complete information about their health, medical questions, and treatment options, including any risks or benefits regarding use of medications. This information does not endorse any treatments or medications as safe, effective, or approved for treating a specific patient. [employer] and its affiliates disclaim any warranty or liability relating to this information or the use thereof.The use of this information is governed by the Terms of Use, available at  https://www.[employer].com/en/know/clinical-effectiveness-terms. 2024© [employer] and its affiliates and/or licensors. All rights reserved.  Copyright   © 2024 [employer] and/or its affiliates. All rights reserved.

## 2024-10-07 DIAGNOSIS — J45.41 MODERATE PERSISTENT ASTHMA WITH ACUTE EXACERBATION: Primary | ICD-10-CM

## 2024-10-07 NOTE — TELEPHONE ENCOUNTER
Medication: Breo Ellipta 100-25mcg/actuation inhaler     Dose/Frequency: 1 puff inhalation daily     Quantity: 60 blister    [facility]: [facility]    Office:   [] PCP/Provider -   [x] Speciality/Provider -     Does the patient have enough for 3 days?   [x] Yes   [] No - Send as HP to POD

## 2024-10-08 RX ORDER — FLUTICASONE FUROATE AND VILANTEROL 100; 25 UG/1; UG/1
1 POWDER RESPIRATORY (INHALATION) DAILY
Qty: 60 BLISTER | Refills: 2 | Status: SHIPPED | OUTPATIENT
Start: 2024-10-08

## 2024-10-22 ENCOUNTER — OFFICE VISIT (OUTPATIENT)
Dept: INTERNAL MEDICINE CLINIC | Facility: CLINIC | Age: 49
End: 2024-10-22
Payer: COMMERCIAL

## 2024-10-22 VITALS
SYSTOLIC BLOOD PRESSURE: 128 MMHG | HEART RATE: 80 BPM | WEIGHT: 140.8 LBS | BODY MASS INDEX: 21.34 KG/M2 | HEIGHT: 68 IN | DIASTOLIC BLOOD PRESSURE: 76 MMHG | OXYGEN SATURATION: 98 %

## 2024-10-22 DIAGNOSIS — J45.40 MODERATE PERSISTENT ASTHMA WITHOUT COMPLICATION: ICD-10-CM

## 2024-10-22 DIAGNOSIS — D72.110 IDIOPATHIC HYPEREOSINOPHILIC SYNDROME: ICD-10-CM

## 2024-10-22 DIAGNOSIS — Z13.0 SCREENING FOR DEFICIENCY ANEMIA: ICD-10-CM

## 2024-10-22 DIAGNOSIS — M1A.9XX0 CHRONIC GOUT INVOLVING TOE WITHOUT TOPHUS, UNSPECIFIED CAUSE, UNSPECIFIED LATERALITY: Primary | ICD-10-CM

## 2024-10-22 DIAGNOSIS — R73.02 GLUCOSE INTOLERANCE (IMPAIRED GLUCOSE TOLERANCE): ICD-10-CM

## 2024-10-22 DIAGNOSIS — I10 PRIMARY HYPERTENSION: ICD-10-CM

## 2024-10-22 DIAGNOSIS — Z12.11 SCREENING FOR COLON CANCER: ICD-10-CM

## 2024-10-22 DIAGNOSIS — J45.41 MODERATE PERSISTENT ASTHMA WITH ACUTE EXACERBATION: ICD-10-CM

## 2024-10-22 DIAGNOSIS — E78.2 MIXED HYPERLIPIDEMIA: ICD-10-CM

## 2024-10-22 DIAGNOSIS — E55.9 VITAMIN D DEFICIENCY: ICD-10-CM

## 2024-10-22 PROCEDURE — 99214 OFFICE O/P EST MOD 30 MIN: CPT | Performed by: INTERNAL MEDICINE

## 2024-10-22 RX ORDER — COLCHICINE 0.6 MG/1
0.6 TABLET ORAL DAILY
Qty: 30 TABLET | Refills: 5 | Status: SHIPPED | OUTPATIENT
Start: 2024-10-22

## 2024-10-22 RX ORDER — MONTELUKAST SODIUM 10 MG/1
10 TABLET ORAL
Qty: 90 TABLET | Refills: 1 | Status: SHIPPED | OUTPATIENT
Start: 2024-10-22

## 2024-10-22 RX ORDER — FLUTICASONE FUROATE AND VILANTEROL 100; 25 UG/1; UG/1
1 POWDER RESPIRATORY (INHALATION) DAILY
Qty: 180 BLISTER | Refills: 2 | Status: SHIPPED | OUTPATIENT
Start: 2024-10-22 | End: 2024-10-22

## 2024-10-22 RX ORDER — FLUTICASONE FUROATE AND VILANTEROL TRIFENATATE 50; 25 UG/1; UG/1
1 POWDER RESPIRATORY (INHALATION) DAILY
Qty: 190 EACH | Refills: 1 | Status: SHIPPED | OUTPATIENT
Start: 2024-10-22 | End: 2025-01-20

## 2024-10-22 NOTE — ASSESSMENT & PLAN NOTE
Last CBC reviewed the eosinophil count elevated    History of asthma    Will check CBC monitor closely if needed hematology consult

## 2024-10-22 NOTE — ASSESSMENT & PLAN NOTE
Blood pressure very well-controlled asymptomatic agree and continue manage medication as follow    Low-salt diet monitor blood pressure at home    Check BMP urine analysis before next visit

## 2024-10-22 NOTE — ASSESSMENT & PLAN NOTE
Previous LDL reviewed    Agree and continue management medication as follow    Lipitor 20 mg daily    Check lipid profile and LFT before next visit

## 2024-10-22 NOTE — PROGRESS NOTES
Dr. Andrews's Office Visit Note  10/22/24     Francis Espana 49 y.o. male MRN: [id number]  : 1975    Assessment:     1. Screening for deficiency anemia  -     CBC and differential; Future  -     Comprehensive metabolic panel; Future  -     CBC and differential  -     Comprehensive metabolic panel  2. Moderate persistent asthma with acute exacerbation  Assessment & Plan:  Occasional wheezing for which patient using Proventil inhaler but persistent coughing when get exposed to some fumes at work and when get exposed to sudden change temperature    Will continue management regimen as follows    Breo 1 puff daily    Proventil 2 puff 4 times a day as needed    Singular 10 mg daily  Orders:  -     Comprehensive metabolic panel; Future  -     Comprehensive metabolic panel  3. Primary hypertension  Assessment & Plan:  Blood pressure very well-controlled asymptomatic agree and continue manage medication as follow    Low-salt diet monitor blood pressure at home    Check BMP urine analysis before next visit      Orders:  -     Comprehensive metabolic panel; Future  -     Albumin / creatinine urine ratio; Future  -     Urinalysis with microscopic; Future  -     Comprehensive metabolic panel  -     Albumin / creatinine urine ratio  -     Urinalysis with microscopic  4. Idiopathic hypereosinophilic syndrome  Assessment & Plan:  Last CBC reviewed the eosinophil count elevated    History of asthma    Will check CBC monitor closely if needed hematology consult  Orders:  -     Comprehensive metabolic panel; Future  -     Comprehensive metabolic panel  5. 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 Glucose intolerance (impaired glucose tolerance)  -     Hemoglobin A1C; Future  -     Hemoglobin A1C  7. Mixed hyperlipidemia  Assessment & Plan:  Previous LDL reviewed    Agree and continue management medication as follow    Lipitor 20 mg daily    Check lipid profile and LFT before next visit  Orders:  -     Lipid Panel with Direct LDL reflex; Future  -     Lipid Panel with Direct LDL reflex  8. Vitamin D deficiency  -     Vitamin D 25 hydroxy; Future; Expected date: 11/22/2024  -     Vitamin D 25 hydroxy  9. Moderate persistent asthma without complication  Assessment & Plan:  Occasional wheezing for which patient using Proventil inhaler but persistent coughing when get exposed to some fumes at work and when get exposed to sudden change temperature    Will continue management regimen as follows    Breo 1 puff daily    Proventil 2 puff 4 times a day as needed    Singular 10 mg daily  Orders:  -     Fluticasone Furoate-Vilanterol (Breo Ellipta) 50-25 MCG/ACT AEPB; Inhale 1 puff daily Rinse mouth after use.  -     montelukast (SINGULAIR) 10 mg tablet; Take 1 tablet (10 mg total) by mouth daily at bedtime        Discussion Summary and Plan:  Today's care plan and medications were reviewed with patient in detail and all their questions answered to their satisfaction.    Chief Complaint   Patient presents with    Follow-up      Subjective:  Came in follow-up chronic medical condition listed visit diagnosis persistent coughing intermittent occasional wheezing no chest pain difficulty breathing history of asthma previous labs reviewed and was given new set up complete labs to be done before next visit been getting recurrent gout attacks we will check uric acid level started on prophylaxis with  colchicine no chest pain no nausea vomiting no fever chills        The following portions of the patient's history were reviewed and updated as appropriate: allergies, current medications, past family history, past medical history, past social history, past surgical history and problem list.    Review of Systems   Constitutional:  Negative for activity change, appetite change, chills, diaphoresis, fatigue, fever and unexpected weight change.   HENT:  Negative for congestion, dental problem, drooling, ear discharge, ear pain, facial swelling, hearing loss, mouth sores, nosebleeds, postnasal drip, rhinorrhea, sinus pressure, sneezing, sore throat, tinnitus, trouble swallowing and voice change.    Eyes:  Negative for photophobia, pain, discharge, redness, itching and visual disturbance.   Respiratory:  Positive for cough and wheezing. Negative for apnea, choking, chest tightness, shortness of breath and stridor.    Cardiovascular:  Negative for chest pain, palpitations and leg swelling.   Gastrointestinal:  Negative for abdominal distention, abdominal pain, anal bleeding, blood in stool, constipation, diarrhea, nausea, rectal pain and vomiting.   Endocrine: Negative for cold intolerance, heat intolerance, polydipsia, polyphagia and polyuria.   Genitourinary:  Negative for decreased urine volume, difficulty urinating, dysuria, enuresis, flank pain, frequency, genital sores, hematuria and urgency.   Musculoskeletal:  Positive for arthralgias. Negative for back pain, gait problem, joint swelling, myalgias, neck pain and neck stiffness.   Skin:  Negative for color change, pallor, rash and wound.   Allergic/Immunologic: Negative.  Negative for environmental allergies, food allergies and immunocompromised state.   Neurological:  Negative for dizziness, tremors, seizures, syncope, facial asymmetry, speech difficulty, weakness, light-headedness, numbness and headaches.   Psychiatric/Behavioral:  Negative for agitation,  behavioral problems, confusion, decreased concentration, dysphoric mood, hallucinations, self-injury, sleep disturbance and suicidal ideas. The patient is not nervous/anxious and is not hyperactive.          Historical Information   Patient Active Problem List   Diagnosis    Chronic gout    Primary hypertension    Moderate persistent asthma without complication    Eosinophilia    Hypercholesteremia    Mixed hyperlipidemia     Past Medical History:   Diagnosis Date    Hypertension      History reviewed. No pertinent surgical history.  Social History     Substance and Sexual Activity   Alcohol Use Not Currently     Social History     Substance and Sexual Activity   Drug Use Not Currently     Social History     Tobacco Use   Smoking Status Never   Smokeless Tobacco Never     Family History   Problem Relation Age of Onset    No Known Problems Mother     Hypertension Father     Prostate cancer Father      Health Maintenance Due   Topic    Hepatitis C Screening     Pneumococcal Vaccine: Pediatrics (0 to 5 Years) and At-Risk Patients (6 to 64 Years) (1 of 2 - PCV)    HIV Screening     Annual Physical     Colorectal Cancer Screening     Zoster Vaccine (1 of 2)    Influenza Vaccine (1)    COVID-19 Vaccine (3 - 2023-24 season)      Meds/Allergies       Current Outpatient Medications:     albuterol (PROVENTIL HFA,VENTOLIN HFA) 90 mcg/act inhaler, Inhale 2 puffs every 6 (six) hours as needed for wheezing, Disp: 18 g, Rfl: 5    atorvastatin (LIPITOR) 20 mg tablet, Take 1 tablet (20 mg total) by mouth daily, Disp: 90 tablet, Rfl: 1    colchicine (COLCRYS) 0.6 mg tablet, Take 1 tablet (0.6 mg total) by mouth daily, Disp: 30 tablet, Rfl: 5    Fluticasone Furoate-Vilanterol (Breo Ellipta) 50-25 MCG/ACT AEPB, Inhale 1 puff daily Rinse mouth after use., Disp: 190 each, Rfl: 1    loratadine (CLARITIN) 10 mg tablet, Take 10 mg by mouth daily, Disp: , Rfl:     losartan (COZAAR) 50 mg tablet, Take 1 tablet (50 mg total) by mouth daily,  "Disp: 90 tablet, Rfl: 1    montelukast (SINGULAIR) 10 mg tablet, Take 1 tablet (10 mg total) by mouth daily at bedtime, Disp: 90 tablet, Rfl: 1    Multiple Vitamin (MULTI VITAMIN DAILY PO), Take by mouth, Disp: , Rfl:       Objective:    Vitals:   /76   Pulse 80   Ht 5' 8\" (1.727 m)   Wt 63.9 kg (140 lb 12.8 oz)   SpO2 98%   BMI 21.41 kg/m²   Body mass index is 21.41 kg/m².  Vitals:    10/22/24 1602   Weight: 63.9 kg (140 lb 12.8 oz)       Physical Exam  Vitals and nursing note reviewed.   Constitutional:       General: He is not in acute distress.     Appearance: He is well-developed. He is not ill-appearing, toxic-appearing or diaphoretic.   HENT:      Head: Normocephalic and atraumatic.      Right Ear: External ear normal.      Left Ear: External ear normal.      Nose: Nose normal.      Mouth/Throat:      Pharynx: No oropharyngeal exudate.   Eyes:      General: Lids are normal. Lids are everted, no foreign bodies appreciated. No scleral icterus.        Right eye: No discharge.         Left eye: No discharge.      Conjunctiva/sclera: Conjunctivae normal.      Pupils: Pupils are equal, round, and reactive to light.   Neck:      Thyroid: No thyromegaly.      Vascular: Normal carotid pulses. No carotid bruit, hepatojugular reflux or JVD.      Trachea: No tracheal tenderness or tracheal deviation.   Cardiovascular:      Rate and Rhythm: Normal rate and regular rhythm.      Pulses: Normal pulses.      Heart sounds: Normal heart sounds. No murmur heard.     No friction rub. No gallop.   Pulmonary:      Effort: Pulmonary effort is normal. No respiratory distress.      Breath sounds: No stridor. Wheezing present. No rales.   Chest:      Chest wall: No tenderness.   Abdominal:      General: Bowel sounds are normal. There is no distension.      Palpations: Abdomen is soft. There is no mass.      Tenderness: There is no abdominal tenderness. There is no guarding or rebound.   Musculoskeletal:         General: No " tenderness or deformity. Normal range of motion.      Cervical back: Normal range of motion and neck supple. No edema, erythema or rigidity. No spinous process tenderness or muscular tenderness. Normal range of motion.   Lymphadenopathy:      Head:      Right side of head: No submental, submandibular, tonsillar, preauricular or posterior auricular adenopathy.      Left side of head: No submental, submandibular, tonsillar, preauricular, posterior auricular or occipital adenopathy.      Cervical: No cervical adenopathy.      Right cervical: No superficial, deep or posterior cervical adenopathy.     Left cervical: No superficial, deep or posterior cervical adenopathy.      Upper Body:      Right upper body: No pectoral adenopathy.      Left upper body: No pectoral adenopathy.   Skin:     General: Skin is warm and dry.      Coloration: Skin is not pale.      Findings: No erythema or rash.   Neurological:      General: No focal deficit present.      Mental Status: He is alert and oriented to person, place, and time.      Cranial Nerves: No cranial nerve deficit.      Sensory: No sensory deficit.      Motor: No tremor, abnormal muscle tone or seizure activity.      Coordination: Coordination normal.      Gait: Gait normal.      Deep Tendon Reflexes: Reflexes are normal and symmetric. Reflexes normal.   Psychiatric:         Behavior: Behavior normal.         Thought Content: Thought content normal.         Judgment: Judgment normal.         Lab Review   No visits with results within 2 Month(s) from this visit.   Latest known visit with results is:   Orders Only on 03/29/2024   Component Date Value Ref Range Status    White Blood Cell Count 03/29/2024 5.8  3.4 - 10.8 x10E3/uL Final    Red Blood Cell Count 03/29/2024 4.80  4.14 - 5.80 x10E6/uL Final    Hemoglobin 03/29/2024 14.5  13.0 - 17.7 g/dL Final    HCT 03/29/2024 44.1  37.5 - 51.0 % Final    MCV 03/29/2024 92  79 - 97 fL Final    MCH 03/29/2024 30.2  26.6 - 33.0 pg  Final    MCHC 03/29/2024 32.9  31.5 - 35.7 g/dL Final    RDW 03/29/2024 12.9  11.6 - 15.4 % Final    Platelet Count 03/29/2024 240  150 - 450 x10E3/uL Final    Neutrophils 03/29/2024 40  Not Estab. % Final    Lymphocytes 03/29/2024 38  Not Estab. % Final    Monocytes 03/29/2024 8  Not Estab. % Final    Eosinophils 03/29/2024 12  Not Estab. % Final    Basophils PCT 03/29/2024 2  Not Estab. % Final    Neutrophils (Absolute) 03/29/2024 2.3  1.4 - 7.0 x10E3/uL Final    Lymphocytes (Absolute) 03/29/2024 2.2  0.7 - 3.1 x10E3/uL Final    Monocytes (Absolute) 03/29/2024 0.5  0.1 - 0.9 x10E3/uL Final    Eosinophils (Absolute) 03/29/2024 0.7 (H)  0.0 - 0.4 x10E3/uL Final    Basophils ABS 03/29/2024 0.1  0.0 - 0.2 x10E3/uL Final    Immature Granulocytes 03/29/2024 0  Not Estab. % Final    Immature Granulocytes (Absolute) 03/29/2024 0.0  0.0 - 0.1 x10E3/uL Final    Comment: A hand-written panel/profile was received from your office. In  accordance with the [facility] Ambiguous Test Code Policy dated July 2003, we have assigned CBC with Differential/Platelet, Test Code  #[id number] to this request. If this is not the testing you wished to  receive on this specimen, please contact the [facility] Client Inquiry/  Technical Services Department to clarify the test order. We  appreciate your business.      Glucose, Random 03/29/2024 88  70 - 99 mg/dL Final    BUN 03/29/2024 25 (H)  6 - 24 mg/dL Final    Creatinine 03/29/2024 1.11  0.76 - 1.27 mg/dL Final    eGFR 03/29/2024 81  >59 mL/min/1.73 Final    SL AMB BUN/CREATININE RATIO 03/29/2024 23 (H)  9 - 20 Final    Sodium 03/29/2024 140  134 - 144 mmol/L Final    Potassium 03/29/2024 4.2  3.5 - 5.2 mmol/L Final    Chloride 03/29/2024 104  96 - 106 mmol/L Final    CO2 03/29/2024 23  20 - 29 mmol/L Final    CALCIUM 03/29/2024 9.9  8.7 - 10.2 mg/dL Final    Protein, Total 03/29/2024 7.3  6.0 - 8.5 g/dL Final    Albumin 03/29/2024 4.6  4.1 - 5.1 g/dL Final    Globulin, Total 03/29/2024 2.7  1.5 -  "4.5 g/dL Final    Albumin/Globulin Ratio 03/29/2024 1.7  1.2 - 2.2 Final    TOTAL BILIRUBIN 03/29/2024 0.4  0.0 - 1.2 mg/dL Final    Alk Phos Isoenzymes 03/29/2024 43 (L)  44 - 121 IU/L Final    AST 03/29/2024 22  0 - 40 IU/L Final    ALT 03/29/2024 20  0 - 44 IU/L Final    Cholesterol, Total 03/29/2024 238 (H)  100 - 199 mg/dL Final    Triglycerides 03/29/2024 92  0 - 149 mg/dL Final    HDL 03/29/2024 64  >39 mg/dL Final    VLDL Cholesterol Calculated 03/29/2024 16  5 - 40 mg/dL Final    LDL Calculated 03/29/2024 158 (H)  0 - 99 mg/dL Final    25-HYDROXY VIT D 03/29/2024 39.2  30.0 - 100.0 ng/mL Final    Comment: Vitamin D deficiency has been defined by the [place] of  [employer]icine and an Endocrine Society practice guideline as a  level of serum 25-OH vitamin D less than 20 ng/mL (1,2).  The Endocrine Society went on to further define vitamin D  insufficiency as a level between 21 and 29 ng/mL (2).  1. IOM ([place] [employer]). 2010. Dietary reference     intakes for calcium and D. [place]: The     National Academies Press.  2. Prem MF, Nydia NC, Hernandez KIM, et al.     Evaluation, treatment, and prevention of vitamin D     deficiency: an Endocrine Society clinical practice     guideline. JCEM. 2011 Jul; 96(7):1911-30.      Uric Acid 03/29/2024 7.5  3.8 - 8.4 mg/dL Final               Therapeutic target for gout patients: <6.0         Patient Instructions   Pt is symptom free for this problem.  This diagnosis or problem is stable/well controlled  Patient is advised to continue same meds as outlined in medicine list  Pt is advised to continue to follow with specialist if they are following for this problme  Pt should get blood test or other testing as per specialist ( or myself) prior to your next visit.        Elaine Andrews MD        \"This note has been constructed using a voice recognition system.Therefore there may be syntax, spelling, and/or grammatical errors. Please call if you have any " "questions. \"  "

## 2024-10-22 NOTE — ASSESSMENT & PLAN NOTE
Patient been getting the gout once every 2 months lately been getting worse maybe every month had used prednisone for acute episode    Will start colchicine 0.6 mg for prevention 1 daily    Check uric acid level if is high start allopurinol instructed about the low purine diet

## 2024-10-22 NOTE — ASSESSMENT & PLAN NOTE
Occasional wheezing for which patient using Proventil inhaler but persistent coughing when get exposed to some fumes at work and when get exposed to sudden change temperature    Will continue management regimen as follows    Breo 1 puff daily    Proventil 2 puff 4 times a day as needed    Singular 10 mg daily

## 2024-11-24 LAB — URATE SERPL-MCNC: 9.7 MG/DL (ref 3.8–8.4)

## 2024-11-25 ENCOUNTER — RESULTS FOLLOW-UP (OUTPATIENT)
Dept: INTERNAL MEDICINE CLINIC | Facility: CLINIC | Age: 49
End: 2024-11-25

## 2024-11-25 LAB
25(OH)D3+25(OH)D2 SERPL-MCNC: 46 NG/ML (ref 30–100)
ALBUMIN SERPL-MCNC: 4.7 G/DL (ref 3.8–4.9)
ALBUMIN/CREAT UR: <4 MG/G CREAT (ref 0–29)
ALP SERPL-CCNC: 55 IU/L (ref 44–121)
ALT SERPL-CCNC: 25 IU/L (ref 0–44)
APPEARANCE UR: CLEAR
AST SERPL-CCNC: 20 IU/L (ref 0–40)
BACTERIA URNS QL MICRO: NORMAL
BASOPHILS # BLD AUTO: 0.2 X10E3/UL (ref 0–0.2)
BASOPHILS NFR BLD AUTO: 2 %
BILIRUB SERPL-MCNC: 0.3 MG/DL (ref 0–1.2)
BILIRUB UR QL STRIP: NEGATIVE
BUN SERPL-MCNC: 24 MG/DL (ref 6–24)
BUN/CREAT SERPL: 23 (ref 9–20)
CALCIUM SERPL-MCNC: 10.3 MG/DL (ref 8.7–10.2)
CASTS URNS QL MICRO: NORMAL /LPF
CHLORIDE SERPL-SCNC: 103 MMOL/L (ref 96–106)
CHOLEST SERPL-MCNC: 226 MG/DL (ref 100–199)
CO2 SERPL-SCNC: 22 MMOL/L (ref 20–29)
COLOR UR: YELLOW
CREAT SERPL-MCNC: 1.04 MG/DL (ref 0.76–1.27)
CREAT UR-MCNC: 69.3 MG/DL
EGFR: 87 ML/MIN/1.73
EOSINOPHIL # BLD AUTO: 1.3 X10E3/UL (ref 0–0.4)
EOSINOPHIL NFR BLD AUTO: 15 %
EPI CELLS #/AREA URNS HPF: NORMAL /HPF (ref 0–10)
ERYTHROCYTE [DISTWIDTH] IN BLOOD BY AUTOMATED COUNT: 12.6 % (ref 11.6–15.4)
EST. AVERAGE GLUCOSE BLD GHB EST-MCNC: 114 MG/DL
GLOBULIN SER-MCNC: 3.3 G/DL (ref 1.5–4.5)
GLUCOSE SERPL-MCNC: 86 MG/DL (ref 70–99)
GLUCOSE UR QL: NEGATIVE
HBA1C MFR BLD: 5.6 % (ref 4.8–5.6)
HCT VFR BLD AUTO: 46.1 % (ref 37.5–51)
HDLC SERPL-MCNC: 71 MG/DL
HGB BLD-MCNC: 14.8 G/DL (ref 13–17.7)
HGB UR QL STRIP: NEGATIVE
IMM GRANULOCYTES # BLD: 0 X10E3/UL (ref 0–0.1)
IMM GRANULOCYTES NFR BLD: 0 %
KETONES UR QL STRIP: NEGATIVE
LDLC SERPL CALC-MCNC: 141 MG/DL (ref 0–99)
LDLC/HDLC SERPL: 2 RATIO (ref 0–3.6)
LEUKOCYTE ESTERASE UR QL STRIP: NEGATIVE
LYMPHOCYTES # BLD AUTO: 2.3 X10E3/UL (ref 0.7–3.1)
LYMPHOCYTES NFR BLD AUTO: 27 %
MCH RBC QN AUTO: 29.7 PG (ref 26.6–33)
MCHC RBC AUTO-ENTMCNC: 32.1 G/DL (ref 31.5–35.7)
MCV RBC AUTO: 92 FL (ref 79–97)
MICRO URNS: NORMAL
MICRO URNS: NORMAL
MICROALBUMIN UR-MCNC: <3 UG/ML
MONOCYTES # BLD AUTO: 0.6 X10E3/UL (ref 0.1–0.9)
MONOCYTES NFR BLD AUTO: 7 %
NEUTROPHILS # BLD AUTO: 4.1 X10E3/UL (ref 1.4–7)
NEUTROPHILS NFR BLD AUTO: 49 %
NITRITE UR QL STRIP: NEGATIVE
PH UR STRIP: 6 [PH] (ref 5–7.5)
PLATELET # BLD AUTO: 339 X10E3/UL (ref 150–450)
POTASSIUM SERPL-SCNC: 4.1 MMOL/L (ref 3.5–5.2)
PROT SERPL-MCNC: 8 G/DL (ref 6–8.5)
PROT UR QL STRIP: NEGATIVE
RBC # BLD AUTO: 4.99 X10E6/UL (ref 4.14–5.8)
RBC #/AREA URNS HPF: NORMAL /HPF (ref 0–2)
SL AMB VLDL CHOLESTEROL CALC: 14 MG/DL (ref 5–40)
SODIUM SERPL-SCNC: 142 MMOL/L (ref 134–144)
SP GR UR: 1.02 (ref 1–1.03)
TRIGL SERPL-MCNC: 79 MG/DL (ref 0–149)
UROBILINOGEN UR STRIP-ACNC: 0.2 MG/DL (ref 0.2–1)
WBC # BLD AUTO: 8.4 X10E3/UL (ref 3.4–10.8)
WBC #/AREA URNS HPF: NORMAL /HPF (ref 0–5)

## 2024-11-25 NOTE — TELEPHONE ENCOUNTER
----- Message from Elaine Andrews MD sent at 11/25/2024  8:24 AM EST -----  Call patient uric acid level is high at 9.7 patient's appointment sometime in February should come and see Dr. Pollack in the next 2 weeks for treatment for high uric acid and prevention of the gout  ----- Message -----  [facility]  Sent: 11/24/2024   8:07 AM EST  To: Elaine Andrews MD

## 2024-11-25 NOTE — TELEPHONE ENCOUNTER
Spoke with pt.  Stated he had ate a lot of chocolate previously but he denies any sx of gout now.  Offered pt appt to be evaluated but he prefers to keep his February appt.  Advised of gout protocol and to call for appt if any sx present.  No further questions at this time.

## 2025-01-18 DIAGNOSIS — I10 PRIMARY HYPERTENSION: ICD-10-CM

## 2025-01-18 RX ORDER — LOSARTAN POTASSIUM 50 MG/1
TABLET ORAL
Qty: 90 TABLET | Refills: 1 | Status: SHIPPED | OUTPATIENT
Start: 2025-01-18

## 2025-02-03 ENCOUNTER — OFFICE VISIT (OUTPATIENT)
Dept: INTERNAL MEDICINE CLINIC | Facility: CLINIC | Age: 50
End: 2025-02-03
Payer: COMMERCIAL

## 2025-02-03 VITALS
SYSTOLIC BLOOD PRESSURE: 108 MMHG | WEIGHT: 144 LBS | HEIGHT: 68 IN | HEART RATE: 70 BPM | TEMPERATURE: 98 F | OXYGEN SATURATION: 99 % | DIASTOLIC BLOOD PRESSURE: 78 MMHG | RESPIRATION RATE: 15 BRPM | BODY MASS INDEX: 21.82 KG/M2

## 2025-02-03 DIAGNOSIS — I10 PRIMARY HYPERTENSION: ICD-10-CM

## 2025-02-03 DIAGNOSIS — J45.40 MODERATE PERSISTENT ASTHMA WITHOUT COMPLICATION: Primary | ICD-10-CM

## 2025-02-03 DIAGNOSIS — M1A.9XX0 CHRONIC GOUT INVOLVING TOE WITHOUT TOPHUS, UNSPECIFIED CAUSE, UNSPECIFIED LATERALITY: ICD-10-CM

## 2025-02-03 DIAGNOSIS — Z12.11 SCREENING FOR COLON CANCER: ICD-10-CM

## 2025-02-03 DIAGNOSIS — Z00.00 ANNUAL PHYSICAL EXAM: ICD-10-CM

## 2025-02-03 PROCEDURE — 99396 PREV VISIT EST AGE 40-64: CPT | Performed by: INTERNAL MEDICINE

## 2025-02-03 PROCEDURE — 99213 OFFICE O/P EST LOW 20 MIN: CPT | Performed by: INTERNAL MEDICINE

## 2025-02-03 RX ORDER — FLUTICASONE FUROATE AND VILANTEROL TRIFENATATE 50; 25 UG/1; UG/1
POWDER RESPIRATORY (INHALATION)
Status: CANCELLED | OUTPATIENT
Start: 2025-02-03

## 2025-02-03 RX ORDER — PREDNISONE 20 MG/1
20 TABLET ORAL DAILY
Qty: 30 TABLET | Refills: 2 | Status: SHIPPED | OUTPATIENT
Start: 2025-02-03

## 2025-02-03 RX ORDER — FLUTICASONE FUROATE AND VILANTEROL TRIFENATATE 50; 25 UG/1; UG/1
POWDER RESPIRATORY (INHALATION)
COMMUNITY

## 2025-02-03 RX ORDER — PREDNISONE 20 MG/1
TABLET ORAL
COMMUNITY
Start: 2025-01-25 | End: 2025-02-03 | Stop reason: SDUPTHER

## 2025-02-03 RX ORDER — FLUTICASONE FUROATE AND VILANTEROL 200; 25 UG/1; UG/1
1 POWDER RESPIRATORY (INHALATION) DAILY
Qty: 60 BLISTER | Refills: 0 | Status: SHIPPED | OUTPATIENT
Start: 2025-02-03 | End: 2025-03-05

## 2025-02-03 NOTE — ASSESSMENT & PLAN NOTE
Previous BRENDAN uric acid level was elevated    Will check uric acid level if its elevated start allopurinol  Orders:  •  Uric acid; Future

## 2025-02-03 NOTE — PROGRESS NOTES
Adult Annual Physical  Name: Francis Espana      : 1975      MRN: [id number]  Encounter Provider: Elaine Andrews MD  Encounter Date: 2/3/2025   Encounter department: [facility] INTERNAL MEDICINE    Assessment & Plan  Moderate persistent asthma without complication  For now no wheezing no difficulty breathing symptoms controlled    Agree and continue manage medication as follow    Proventil 2 puff 4 times a day as needed    Breo 1 puff daily  Orders:  •  predniSONE 20 mg tablet; Take 1 tablet (20 mg total) by mouth daily  •  fluticasone-vilanterol (Breo Ellipta) 200-25 mcg/actuation inhaler; Inhale 1 puff daily Rinse mouth after use.    Annual physical exam  Annual wellness exam done preventive measures discussed at length reluctant for HIV hepatitis C screen    Cologuard requested    For detailed instruction refer to discharge instruction       Primary hypertension  Blood pressure very well-controlled asymptomatic continue manage medication as follow    Started 50 mg daily low-salt diet previous BMP normal       Screening for colon cancer    Orders:  •  Cologuard    Chronic gout involving toe without tophus, unspecified cause, unspecified laterality  Previous BRENDAN uric acid level was elevated    Will check uric acid level if its elevated start allopurinol  Orders:  •  Uric acid; Future      Immunizations and preventive care screenings were discussed with patient today. Appropriate education was printed on patient's after visit summary.    Discussed risks and benefits of prostate cancer screening. We discussed the controversial history of PSA screening for prostate cancer in the [place] as well as the risk of over detection and over treatment of prostate cancer by way of PSA screening.  The patient understands that PSA blood testing is an imperfect way to screen for prostate cancer and that elevated PSA levels in the blood may also be caused by infection, inflammation, prostatic  trauma or manipulation, urological procedures, or by benign prostatic enlargement.    The role of the digital rectal examination in prostate cancer screening was also discussed and I discussed with him that there is large interobserver variability in the findings of digital rectal examination.    Counseling:  Alcohol/drug use: discussed moderation in alcohol intake, the recommendations for healthy alcohol use, and avoidance of illicit drug use.  Dental Health: discussed importance of regular tooth brushing, flossing, and dental visits.  Injury prevention: discussed safety/seat belts, safety helmets, smoke detectors, carbon monoxide detectors, and smoking near bedding or upholstery.  Sexual health: discussed sexually transmitted diseases, partner selection, use of condoms, avoidance of unintended pregnancy, and contraceptive alternatives.  Exercise: the importance of regular exercise/physical activity was discussed. Recommend exercise 3-5 times per week for at least 30 minutes.          History of Present Illness     Patient came in for follow-up multiple medical problem including asthma gout denies any difficulty breathing wheezing seems to be controlled with Breo previous allergy previous uric acid level was elevated refused allopurinol will repeat uric acid depending on the results further workup treatment no acute gout since the last visit for details refer to assessment plan visit diagnosis    Adult Annual Physical:  Patient presents for annual physical.     Diet and Physical Activity:  - Diet/Nutrition: well balanced diet.  - Exercise: walking.    Depression Screening:  - PHQ-2 Score: 0    General Health:  - Sleep: sleeps well.  - Hearing: normal hearing right ear and normal hearing left ear.  - Vision: no vision problems and vision problems.  - Dental: regular dental visits.     Health:  - History of STDs: no.   - Urinary symptoms: none.     Advanced Care Planning:  - Has an advanced directive?: no    - Has  a durable medical POA?: no    - ACP document given to patient?: no      Review of Systems   Constitutional:  Negative for activity change, appetite change, chills, diaphoresis, fatigue, fever and unexpected weight change.   HENT:  Negative for congestion, dental problem, drooling, ear discharge, ear pain, facial swelling, hearing loss, mouth sores, nosebleeds, postnasal drip, rhinorrhea, sinus pressure, sneezing, sore throat, tinnitus, trouble swallowing and voice change.    Eyes:  Negative for photophobia, pain, discharge, redness, itching and visual disturbance.   Respiratory:  Negative for apnea, choking, chest tightness, shortness of breath and stridor.    Cardiovascular:  Negative for chest pain, palpitations and leg swelling.   Gastrointestinal:  Negative for abdominal distention, abdominal pain, anal bleeding, blood in stool, constipation, diarrhea, nausea, rectal pain and vomiting.   Endocrine: Negative for cold intolerance, heat intolerance, polydipsia, polyphagia and polyuria.   Genitourinary:  Negative for decreased urine volume, difficulty urinating, dysuria, enuresis, flank pain, frequency, genital sores, hematuria and urgency.   Musculoskeletal:  Positive for arthralgias. Negative for back pain, gait problem, joint swelling, myalgias, neck pain and neck stiffness.   Skin:  Negative for color change, pallor, rash and wound.   Allergic/Immunologic: Negative.  Negative for environmental allergies, food allergies and immunocompromised state.   Neurological:  Negative for dizziness, tremors, seizures, syncope, facial asymmetry, speech difficulty, weakness, light-headedness, numbness and headaches.   Psychiatric/Behavioral:  Negative for agitation, behavioral problems, confusion, decreased concentration, dysphoric mood, hallucinations, self-injury, sleep disturbance and suicidal ideas. The patient is not nervous/anxious and is not hyperactive.      Pertinent Medical History       Medical History Reviewed by  provider this encounter:  Tobacco  Allergies  Meds  Problems  Med Hx  Surg Hx  Fam Hx     .  Past Medical History   Past Medical History:   Diagnosis Date   • Hypertension      History reviewed. No pertinent surgical history.  Family History   Problem Relation Age of Onset   • No Known Problems Mother    • Hypertension Father    • Prostate cancer Father       reports that he has never smoked. He has never used smokeless tobacco. He reports that he does not currently use alcohol. He reports that he does not currently use drugs.  Current Outpatient Medications on File Prior to Visit   Medication Sig Dispense Refill   • albuterol (PROVENTIL HFA,VENTOLIN HFA) 90 mcg/act inhaler Inhale 2 puffs every 6 (six) hours as needed for wheezing 18 g 5   • atorvastatin (LIPITOR) 20 mg tablet Take 1 tablet (20 mg total) by mouth daily 90 tablet 1   • colchicine (COLCRYS) 0.6 mg tablet Take 1 tablet (0.6 mg total) by mouth daily 30 tablet 5   • Fluticasone Furoate-Vilanterol (Breo Ellipta) 50-25 MCG/ACT AEPB      • loratadine (CLARITIN) 10 mg tablet Take 10 mg by mouth daily     • losartan (COZAAR) 50 mg tablet TAKE ONE TABLET BY MOUTH EVERY DAY (GENERIC FOR COZAAR) 90 tablet 1   • montelukast (SINGULAIR) 10 mg tablet Take 1 tablet (10 mg total) by mouth daily at bedtime 90 tablet 1   • Multiple Vitamin (MULTI VITAMIN DAILY PO) Take by mouth     • [DISCONTINUED] predniSONE 20 mg tablet  (Patient not taking: Reported on 2/3/2025)       No current facility-administered medications on file prior to visit.     Allergies   Allergen Reactions   • Alcohol Gel Base - Food Allergy GI Intolerance      Current Outpatient Medications on File Prior to Visit   Medication Sig Dispense Refill   • albuterol (PROVENTIL HFA,VENTOLIN HFA) 90 mcg/act inhaler Inhale 2 puffs every 6 (six) hours as needed for wheezing 18 g 5   • atorvastatin (LIPITOR) 20 mg tablet Take 1 tablet (20 mg total) by mouth daily 90 tablet 1   • colchicine (COLCRYS) 0.6 mg  "tablet Take 1 tablet (0.6 mg total) by mouth daily 30 tablet 5   • Fluticasone Furoate-Vilanterol (Breo Ellipta) 50-25 MCG/ACT AEPB      • loratadine (CLARITIN) 10 mg tablet Take 10 mg by mouth daily     • losartan (COZAAR) 50 mg tablet TAKE ONE TABLET BY MOUTH EVERY DAY (GENERIC FOR COZAAR) 90 tablet 1   • montelukast (SINGULAIR) 10 mg tablet Take 1 tablet (10 mg total) by mouth daily at bedtime 90 tablet 1   • Multiple Vitamin (MULTI VITAMIN DAILY PO) Take by mouth     • [DISCONTINUED] predniSONE 20 mg tablet  (Patient not taking: Reported on 2/3/2025)       No current facility-administered medications on file prior to visit.      Social History     Tobacco Use   • Smoking status: Never   • Smokeless tobacco: Never   Vaping Use   • Vaping status: Never Used   Substance and Sexual Activity   • Alcohol use: Not Currently   • Drug use: Not Currently   • Sexual activity: Not on file       Objective   /78   Pulse 70   Temp 98 °F (36.7 °C)   Resp 15   Ht 5' 8\" (1.727 m)   Wt 65.3 kg (144 lb)   SpO2 99%   BMI 21.90 kg/m²     Physical Exam  Vitals and nursing note reviewed.   Constitutional:       General: He is not in acute distress.     Appearance: He is well-developed. He is not ill-appearing, toxic-appearing or diaphoretic.   HENT:      Head: Normocephalic and atraumatic.      Right Ear: External ear normal.      Left Ear: External ear normal.      Nose: Nose normal.      Mouth/Throat:      Pharynx: No oropharyngeal exudate.   Eyes:      General: Lids are normal. Lids are everted, no foreign bodies appreciated. No scleral icterus.        Right eye: No discharge.         Left eye: No discharge.      Conjunctiva/sclera: Conjunctivae normal.      Pupils: Pupils are equal, round, and reactive to light.   Neck:      Thyroid: No thyromegaly.      Vascular: Normal carotid pulses. No carotid bruit, hepatojugular reflux or JVD.      Trachea: No tracheal tenderness or tracheal deviation.   Cardiovascular:      Rate " and Rhythm: Normal rate and regular rhythm.      Pulses: Normal pulses.      Heart sounds: Normal heart sounds. No murmur heard.     No friction rub. No gallop.   Pulmonary:      Effort: Pulmonary effort is normal. No respiratory distress.      Breath sounds: Normal breath sounds. No stridor. No wheezing or rales.   Chest:      Chest wall: No tenderness.   Abdominal:      General: Bowel sounds are normal. There is no distension.      Palpations: Abdomen is soft. There is no mass.      Tenderness: There is no abdominal tenderness. There is no guarding or rebound.   Musculoskeletal:         General: No tenderness or deformity. Normal range of motion.      Cervical back: Normal range of motion and neck supple. No edema, erythema or rigidity. No spinous process tenderness or muscular tenderness. Normal range of motion.   Lymphadenopathy:      Head:      Right side of head: No submental, submandibular, tonsillar, preauricular or posterior auricular adenopathy.      Left side of head: No submental, submandibular, tonsillar, preauricular, posterior auricular or occipital adenopathy.      Cervical: No cervical adenopathy.      Right cervical: No superficial, deep or posterior cervical adenopathy.     Left cervical: No superficial, deep or posterior cervical adenopathy.      Upper Body:      Right upper body: No pectoral adenopathy.      Left upper body: No pectoral adenopathy.   Skin:     General: Skin is warm and dry.      Coloration: Skin is not pale.      Findings: No erythema or rash.   Neurological:      General: No focal deficit present.      Mental Status: He is alert and oriented to person, place, and time.      Cranial Nerves: No cranial nerve deficit.      Sensory: No sensory deficit.      Motor: No tremor, abnormal muscle tone or seizure activity.      Coordination: Coordination normal.      Gait: Gait normal.      Deep Tendon Reflexes: Reflexes are normal and symmetric. Reflexes normal.   Psychiatric:          Behavior: Behavior normal.         Thought Content: Thought content normal.         Judgment: Judgment normal.

## 2025-02-03 NOTE — ASSESSMENT & PLAN NOTE
Blood pressure very well-controlled asymptomatic continue manage medication as follow    Started 50 mg daily low-salt diet previous BMP normal

## 2025-02-03 NOTE — ASSESSMENT & PLAN NOTE
For now no wheezing no difficulty breathing symptoms controlled    Agree and continue manage medication as follow    Proventil 2 puff 4 times a day as needed    Breo 1 puff daily  Orders:  •  predniSONE 20 mg tablet; Take 1 tablet (20 mg total) by mouth daily  •  fluticasone-vilanterol (Breo Ellipta) 200-25 mcg/actuation inhaler; Inhale 1 puff daily Rinse mouth after use.

## 2025-02-12 LAB — COLOGUARD RESULT REPORTABLE: NEGATIVE

## 2025-03-09 ENCOUNTER — RESULTS FOLLOW-UP (OUTPATIENT)
Dept: INTERNAL MEDICINE CLINIC | Facility: CLINIC | Age: 50
End: 2025-03-09

## 2025-03-09 LAB — URATE SERPL-MCNC: 6.7 MG/DL (ref 3.8–8.4)

## 2025-04-22 DIAGNOSIS — J45.40 MODERATE PERSISTENT ASTHMA WITHOUT COMPLICATION: ICD-10-CM

## 2025-04-22 RX ORDER — MONTELUKAST SODIUM 10 MG/1
10 TABLET ORAL
Qty: 90 TABLET | Refills: 1 | Status: SHIPPED | OUTPATIENT
Start: 2025-04-22

## 2025-05-05 ENCOUNTER — OFFICE VISIT (OUTPATIENT)
Dept: INTERNAL MEDICINE CLINIC | Facility: CLINIC | Age: 50
End: 2025-05-05
Payer: COMMERCIAL

## 2025-05-05 VITALS
HEIGHT: 68 IN | HEART RATE: 79 BPM | OXYGEN SATURATION: 98 % | SYSTOLIC BLOOD PRESSURE: 130 MMHG | DIASTOLIC BLOOD PRESSURE: 78 MMHG | WEIGHT: 148 LBS | BODY MASS INDEX: 22.43 KG/M2

## 2025-05-05 DIAGNOSIS — E78.2 MIXED HYPERLIPIDEMIA: ICD-10-CM

## 2025-05-05 DIAGNOSIS — J45.40 MODERATE PERSISTENT ASTHMA WITHOUT COMPLICATION: ICD-10-CM

## 2025-05-05 DIAGNOSIS — I10 PRIMARY HYPERTENSION: Primary | ICD-10-CM

## 2025-05-05 PROCEDURE — 99213 OFFICE O/P EST LOW 20 MIN: CPT | Performed by: INTERNAL MEDICINE

## 2025-05-05 RX ORDER — FLUTICASONE FUROATE AND VILANTEROL 200; 25 UG/1; UG/1
1 POWDER RESPIRATORY (INHALATION) DAILY
Qty: 60 BLISTER | Refills: 2 | Status: SHIPPED | OUTPATIENT
Start: 2025-05-05 | End: 2025-08-03

## 2025-05-05 RX ORDER — FLUTICASONE FUROATE AND VILANTEROL TRIFENATATE 50; 25 UG/1; UG/1
POWDER RESPIRATORY (INHALATION)
Status: CANCELLED | OUTPATIENT
Start: 2025-05-05

## 2025-05-05 NOTE — PROGRESS NOTES
Name: Francis Espana      : 1975      MRN: [id number]  Encounter Provider: Elaine Andrews MD  Encounter Date: 2025   Encounter department: [facility] INTERNAL MEDICINE  :  Assessment & Plan  Moderate persistent asthma without complication  No wheezing no difficulty breathing symptoms controlled agree and continue manage medications Allegra Proventil 2 puff 4 times a day as needed    Continue Breo 1 puff daily as instructed this rate helping patient control symptoms  Orders:  •  fluticasone-vilanterol (Breo Ellipta) 200-25 mcg/actuation inhaler; Inhale 1 puff daily Rinse mouth after use.    Primary hypertension  Asymptomatic    Stable well-controlled    Continue manage medications follow    Losartan 50 mg daily  Low-salt diet    Previous BMP reviewed normal       Mixed hyperlipidemia  Previous LDL reviewed elevated  Recommended statin patient refused chooses low-fat low-cholesterol diet              History of Present Illness   Came in follow-up chronic medical condition list visit diagnosis patient refused any more blood work and refused to be treated for hyperlipidemia no chest pain no difficulty breathing no new symptoms for details refer to assessment plan visit diagnosis    Patient here for review of chronic medical problems and  the labs and imaging if it is applicable.  Currently has no specific complaints other than mentioned in the review of systems  Denies chest pain, SOB, cough, abdominal pain, nausea, vomiting, fever, chills, lightheadedness, dizziness,headache, tingling or numbness.No bowel or bladder problem.       Review of Systems   Constitutional:  Negative for activity change, appetite change, chills, diaphoresis, fatigue, fever and unexpected weight change.   HENT:  Negative for congestion, dental problem, drooling, ear discharge, ear pain, facial swelling, hearing loss, mouth sores, nosebleeds, postnasal drip, rhinorrhea, sinus pressure, sneezing, sore throat,  "tinnitus, trouble swallowing and voice change.    Eyes:  Negative for photophobia, pain, discharge, redness, itching and visual disturbance.   Respiratory:  Negative for apnea, cough, choking, chest tightness, shortness of breath, wheezing and stridor.    Cardiovascular:  Negative for chest pain, palpitations and leg swelling.   Gastrointestinal:  Negative for abdominal distention, abdominal pain, anal bleeding, blood in stool, constipation, diarrhea, nausea, rectal pain and vomiting.   Endocrine: Negative for cold intolerance, heat intolerance, polydipsia, polyphagia and polyuria.   Genitourinary:  Negative for decreased urine volume, difficulty urinating, dysuria, enuresis, flank pain, frequency, genital sores, hematuria and urgency.   Musculoskeletal:  Negative for arthralgias, back pain, gait problem, joint swelling, myalgias, neck pain and neck stiffness.   Skin:  Negative for color change, pallor, rash and wound.   Allergic/Immunologic: Negative.  Negative for environmental allergies, food allergies and immunocompromised state.   Neurological:  Negative for dizziness, tremors, seizures, syncope, facial asymmetry, speech difficulty, weakness, light-headedness, numbness and headaches.   Psychiatric/Behavioral:  Negative for agitation, behavioral problems, confusion, decreased concentration, dysphoric mood, hallucinations, self-injury, sleep disturbance and suicidal ideas. The patient is not nervous/anxious and is not hyperactive.        Objective   /78   Pulse 79   Ht 5' 8\" (1.727 m)   Wt 67.1 kg (148 lb)   SpO2 98%   BMI 22.50 kg/m²      Physical Exam  Vitals and nursing note reviewed.   Constitutional:       General: He is not in acute distress.     Appearance: He is well-developed. He is not ill-appearing, toxic-appearing or diaphoretic.   HENT:      Head: Normocephalic and atraumatic.      Right Ear: External ear normal.      Left Ear: External ear normal.      Nose: Nose normal.      " Mouth/Throat:      Pharynx: No oropharyngeal exudate.   Eyes:      General: Lids are normal. Lids are everted, no foreign bodies appreciated. No scleral icterus.        Right eye: No discharge.         Left eye: No discharge.      Conjunctiva/sclera: Conjunctivae normal.      Pupils: Pupils are equal, round, and reactive to light.   Neck:      Thyroid: No thyromegaly.      Vascular: Normal carotid pulses. No carotid bruit, hepatojugular reflux or JVD.      Trachea: No tracheal tenderness or tracheal deviation.   Cardiovascular:      Rate and Rhythm: Normal rate and regular rhythm.      Pulses: Normal pulses.      Heart sounds: Normal heart sounds. No murmur heard.     No friction rub. No gallop.   Pulmonary:      Effort: Pulmonary effort is normal. No respiratory distress.      Breath sounds: Normal breath sounds. No stridor. No wheezing or rales.   Chest:      Chest wall: No tenderness.   Abdominal:      General: Bowel sounds are normal. There is no distension.      Palpations: Abdomen is soft. There is no mass.      Tenderness: There is no abdominal tenderness. There is no guarding or rebound.   Musculoskeletal:         General: No tenderness or deformity. Normal range of motion.      Cervical back: Normal range of motion and neck supple. No edema, erythema or rigidity. No spinous process tenderness or muscular tenderness. Normal range of motion.   Lymphadenopathy:      Head:      Right side of head: No submental, submandibular, tonsillar, preauricular or posterior auricular adenopathy.      Left side of head: No submental, submandibular, tonsillar, preauricular, posterior auricular or occipital adenopathy.      Cervical: No cervical adenopathy.      Right cervical: No superficial, deep or posterior cervical adenopathy.     Left cervical: No superficial, deep or posterior cervical adenopathy.      Upper Body:      Right upper body: No pectoral adenopathy.      Left upper body: No pectoral adenopathy.   Skin:      General: Skin is warm and dry.      Coloration: Skin is not pale.      Findings: No erythema or rash.   Neurological:      General: No focal deficit present.      Mental Status: He is alert and oriented to person, place, and time.      Cranial Nerves: No cranial nerve deficit.      Sensory: No sensory deficit.      Motor: No tremor, abnormal muscle tone or seizure activity.      Coordination: Coordination normal.      Gait: Gait normal.      Deep Tendon Reflexes: Reflexes are normal and symmetric. Reflexes normal.   Psychiatric:         Behavior: Behavior normal.         Thought Content: Thought content normal.         Judgment: Judgment normal.

## 2025-05-05 NOTE — ASSESSMENT & PLAN NOTE
Previous LDL reviewed elevated  Recommended statin patient refused chooses low-fat low-cholesterol diet

## 2025-05-05 NOTE — ASSESSMENT & PLAN NOTE
No wheezing no difficulty breathing symptoms controlled agree and continue manage medications Allegra Proventil 2 puff 4 times a day as needed    Continue Breo 1 puff daily as instructed this rate helping patient control symptoms  Orders:  •  fluticasone-vilanterol (Breo Ellipta) 200-25 mcg/actuation inhaler; Inhale 1 puff daily Rinse mouth after use.

## 2025-05-05 NOTE — ASSESSMENT & PLAN NOTE
Asymptomatic    Stable well-controlled    Continue manage medications follow    Losartan 50 mg daily  Low-salt diet    Previous BMP reviewed normal

## 2025-06-02 ENCOUNTER — TELEPHONE (OUTPATIENT)
Dept: INTERNAL MEDICINE CLINIC | Facility: CLINIC | Age: 50
End: 2025-06-02

## 2025-06-02 DIAGNOSIS — J45.40 MODERATE PERSISTENT ASTHMA WITHOUT COMPLICATION: ICD-10-CM

## 2025-06-02 RX ORDER — FLUTICASONE FUROATE AND VILANTEROL 200; 25 UG/1; UG/1
1 POWDER RESPIRATORY (INHALATION) DAILY
Qty: 60 BLISTER | Refills: 2 | Status: SHIPPED | OUTPATIENT
Start: 2025-06-02 | End: 2025-08-31

## 2025-06-02 NOTE — TELEPHONE ENCOUNTER
Pharmacy called the RX Refill Line. Message is being forwarded to the office.     Pharmacy is requesting a message be sent to the office regarding the following medication:    Fluticasone Furoate-Vilanterol (Breo Ellipta) 50-25 MCG/ACT AE     [id number] - [place], [place] - [address] [id number]    Medication was last prescribed 10/22/2024, please review.       Please contact Pharmacy at [phone]

## 2025-08-04 ENCOUNTER — OFFICE VISIT (OUTPATIENT)
Age: 50
End: 2025-08-04
Payer: COMMERCIAL

## 2025-08-04 VITALS
BODY MASS INDEX: 22.28 KG/M2 | RESPIRATION RATE: 15 BRPM | HEIGHT: 68 IN | WEIGHT: 147 LBS | HEART RATE: 70 BPM | TEMPERATURE: 98.4 F | OXYGEN SATURATION: 98 % | SYSTOLIC BLOOD PRESSURE: 102 MMHG | DIASTOLIC BLOOD PRESSURE: 62 MMHG

## 2025-08-04 DIAGNOSIS — M1A.9XX0 CHRONIC GOUT INVOLVING TOE WITHOUT TOPHUS, UNSPECIFIED CAUSE, UNSPECIFIED LATERALITY: ICD-10-CM

## 2025-08-04 DIAGNOSIS — J45.40 MODERATE PERSISTENT ASTHMA WITHOUT COMPLICATION: Primary | ICD-10-CM

## 2025-08-04 PROCEDURE — 99213 OFFICE O/P EST LOW 20 MIN: CPT | Performed by: INTERNAL MEDICINE

## 2025-08-04 RX ORDER — FLUTICASONE FUROATE AND VILANTEROL 100; 25 UG/1; UG/1
1 POWDER RESPIRATORY (INHALATION) DAILY
Qty: 180 BLISTER | Refills: 0 | Status: SHIPPED | OUTPATIENT
Start: 2025-08-04 | End: 2025-11-02

## 2025-08-04 RX ORDER — FLUTICASONE FUROATE AND VILANTEROL 200; 25 UG/1; UG/1
1 POWDER RESPIRATORY (INHALATION) DAILY
Qty: 60 BLISTER | Refills: 2 | Status: SHIPPED | OUTPATIENT
Start: 2025-08-04 | End: 2025-08-04

## 2025-08-04 RX ORDER — FLUTICASONE FUROATE AND VILANTEROL 100; 25 UG/1; UG/1
1 POWDER RESPIRATORY (INHALATION) DAILY
Qty: 60 BLISTER | Refills: 0 | Status: SHIPPED | OUTPATIENT
Start: 2025-08-04 | End: 2025-08-04